# Patient Record
Sex: FEMALE | Race: WHITE | Employment: OTHER | URBAN - METROPOLITAN AREA
[De-identification: names, ages, dates, MRNs, and addresses within clinical notes are randomized per-mention and may not be internally consistent; named-entity substitution may affect disease eponyms.]

---

## 2021-04-13 ENCOUNTER — HOSPITAL ENCOUNTER (OUTPATIENT)
Dept: PREADMISSION TESTING | Age: 81
Discharge: HOME | End: 2021-04-13
Payer: MEDICARE

## 2021-04-13 ENCOUNTER — TRANSCRIBE ORDER (OUTPATIENT)
Dept: REGISTRATION | Age: 81
End: 2021-04-13

## 2021-04-13 DIAGNOSIS — Z01.818 PREOPERATIVE EXAMINATION, UNSPECIFIED: Primary | ICD-10-CM

## 2021-04-13 DIAGNOSIS — Z01.818 PREOPERATIVE EXAMINATION, UNSPECIFIED: ICD-10-CM

## 2021-04-13 LAB
ANION GAP SERPL CALC-SCNC: 5 MMOL/L (ref 3–18)
ATRIAL RATE: 79 BPM
BUN SERPL-MCNC: 13 MG/DL (ref 7–18)
BUN/CREAT SERPL: 22 (ref 12–20)
CALCIUM SERPL-MCNC: 9.1 MG/DL (ref 8.5–10.1)
CALCULATED P AXIS, ECG09: 89 DEGREES
CALCULATED R AXIS, ECG10: 75 DEGREES
CALCULATED T AXIS, ECG11: 87 DEGREES
CHLORIDE SERPL-SCNC: 106 MMOL/L (ref 100–111)
CO2 SERPL-SCNC: 28 MMOL/L (ref 21–32)
CREAT SERPL-MCNC: 0.6 MG/DL (ref 0.6–1.3)
DIAGNOSIS, 93000: NORMAL
ERYTHROCYTE [DISTWIDTH] IN BLOOD BY AUTOMATED COUNT: 13.1 % (ref 11.6–14.5)
GLUCOSE SERPL-MCNC: 97 MG/DL (ref 74–99)
HCT VFR BLD AUTO: 38.7 % (ref 35–45)
HGB BLD-MCNC: 12 G/DL (ref 12–16)
MCH RBC QN AUTO: 26.8 PG (ref 24–34)
MCHC RBC AUTO-ENTMCNC: 31 G/DL (ref 31–37)
MCV RBC AUTO: 86.4 FL (ref 74–97)
P-R INTERVAL, ECG05: 190 MS
PLATELET # BLD AUTO: 250 K/UL (ref 135–420)
PMV BLD AUTO: 10.2 FL (ref 9.2–11.8)
POTASSIUM SERPL-SCNC: 4.3 MMOL/L (ref 3.5–5.5)
Q-T INTERVAL, ECG07: 372 MS
QRS DURATION, ECG06: 94 MS
QTC CALCULATION (BEZET), ECG08: 426 MS
RBC # BLD AUTO: 4.48 M/UL (ref 4.2–5.3)
SODIUM SERPL-SCNC: 139 MMOL/L (ref 136–145)
VENTRICULAR RATE, ECG03: 79 BPM
WBC # BLD AUTO: 6.2 K/UL (ref 4.6–13.2)

## 2021-04-13 PROCEDURE — 85027 COMPLETE CBC AUTOMATED: CPT

## 2021-04-13 PROCEDURE — 36415 COLL VENOUS BLD VENIPUNCTURE: CPT

## 2021-04-13 PROCEDURE — 80048 BASIC METABOLIC PNL TOTAL CA: CPT

## 2021-04-13 PROCEDURE — 93005 ELECTROCARDIOGRAM TRACING: CPT

## 2021-04-23 ENCOUNTER — HOSPITAL ENCOUNTER (OUTPATIENT)
Dept: PREADMISSION TESTING | Age: 81
Discharge: HOME | End: 2021-04-23
Payer: MEDICARE

## 2021-04-23 PROCEDURE — U0003 INFECTIOUS AGENT DETECTION BY NUCLEIC ACID (DNA OR RNA); SEVERE ACUTE RESPIRATORY SYNDROME CORONAVIRUS 2 (SARS-COV-2) (CORONAVIRUS DISEASE [COVID-19]), AMPLIFIED PROBE TECHNIQUE, MAKING USE OF HIGH THROUGHPUT TECHNOLOGIES AS DESCRIBED BY CMS-2020-01-R: HCPCS

## 2021-04-24 LAB — SARS-COV-2, COV2NT: NOT DETECTED

## 2021-04-27 NOTE — H&P
Urology [address] [place], [place] [address] [address]  Tel: [phone]  Fax: [phone]    Patient : Alissa Fall   YOB: 1940   Birth Sex: Female      Current Gender: Female      Date:  04/09/2021 8:30 AM    Visit Type:   Office Visit   Assessment/Plan  # Detail Type Description    1. Assessment Frequency of micturition (R35.0). Patient Plan Pt underwent a PNE today, the pt tolerated the procedure well. The patient will be scheduled for 1st or 1st and 2nd Stage [employer]onics depending on the PNE results         2. Assessment Urgency of urination (R39.15). 3. Assessment Overactive bladder (N32.81). 4. Assessment History of recurrent urinary tract infection (Z87.440). Additional Visit Information      This 79year old female presents for Overactive Bladder and UTI. History of Present Illness  1. Overactive Bladder   Onset was gradual. It occurs daily. The problem is improving. Pertinent negatives include chills, constipation and fever. Additional information: pt w Symptoms consistent with overactive bladder including pelvic pressure burning before and after voiding frequency urgency urge incontinence she is currently on Detrol LA 4 mg which he thinks is helping she also thinks that helps better than Myrbetriq however she was on Myrbetriq for short period of time. At this time she will continue with current medications. Yarelis Prim 02/01/2021  Pt changed To start back on Myrbetriq 25 mg and stopped Detrol. She still having difficulties. I recommend that she restart on Detrol LA in addition to Myrbetriq 25 mg. If no improvement after 2 weeks she should call and let me know and we can increase her to 50 mg.  3/15/2021  Pt has minimal improvement on combination of Myrbetriq 50mg and Detrol LA 4mg. She is moving to [place] in June.   We reviewed options including Axonics SNS and Botox  4/9/2021  Pt here for f/u she is here to undergo PNE    2.  UTI   Pertinent history includes being over 49. Pertinent history does not include diabetes, alcohol consumption or prostatitis. Pertinent negatives include constipation. Additional information: Patient is here today because she has had 2  urinary tract infections. However both infections grew skin contaminants. It is difficult to determine if this is actually real infection. So today will be obtain a catheterized specimen for culture this will certainly tell us if she is actually having a urinary tract infection or if her specimen was more of a poor specimen given by the patient or for is actually infection. 2/1/2021  Pt appears to have UTI, I will send Keflex 250mg #21. Past Medical/Surgical History   (Reviewed, updated)  Disease/disorder Onset Date Management Date Comments     Cholecystectomy         Problem List  Problem List reviewed. Medications (active prior to today)  Medication Instructions Start Date Stop Date Refilled Elsewhere   Fosamax 70 mg tablet take 1 tablet by oral route  every week in the morning, at least 30 min before first food, beverage, or medication of day //   Y   Vitamin D2 1,250 mcg (50,000 unit) capsule take 1 capsule by ORAL route  every month //   Y   Prilosec 2.5 mg oral suspension,delayed release  //   Y   Zocor 10 mg tablet take 1 tablet by oral route  every day in the evening //   Y   Lyrica 75 mg capsule take 1 capsule by ORAL route 3 times every day 11/13/2020   N   Centrum Silver 0.4 mg-300 mcg-250 mcg tablet  //   Y   Flonase Allergy Relief 50 mcg/actuation nasal spray,suspension inhale 2 spray by intranasal route  every day in each nostril //   Kerline Less  //   Y   Detrol LA 4 mg capsule,extended release take 1 capsule by oral route  every day 02/01/2021 02/01/2021 N   Myrbetriq 50 mg tablet,extended release take 1 tablet by oral route  every day swallowing whole with water. Do not crush, chew and/or divide.  02/26/2021 2021 N       Medication Reconciliation  Medications reconciled today. Medication Reviewed  Adherence Medication Name Sig Desc Elsewhere Status   taking as directed Centrum Silver 0.4 mg-300 mcg-250 mcg tablet  Y Verified   taking as directed Fosamax 70 mg tablet take 1 tablet by oral route  every week in the morning, at least 30 min before first food, beverage, or medication of day Y Verified   taking as directed Zocor 10 mg tablet take 1 tablet by oral route  every day in the evening Y Verified   taking as directed Detrol LA 4 mg capsule,extended release take 1 capsule by oral route  every day N Verified   taking as directed Lyrica 75 mg capsule take 1 capsule by ORAL route 3 times every day N Verified   taking as directed Flonase Allergy Relief 50 mcg/actuation nasal spray,suspension inhale 2 spray by intranasal route  every day in each nostril Y Verified   taking as directed Myrbetriq 50 mg tablet,extended release take 1 tablet by oral route  every day swallowing whole with water. Do not crush, chew and/or divide. N Verified   taking as directed Vitamin D2 1,250 mcg (50,000 unit) capsule take 1 capsule by ORAL route  every month Y Verified   taking as directed Prilosec 2.5 mg oral suspension,delayed release  Y Verified   taking as directed [id number]  [address] Reaction (Severity) Medication Name Comment   PENICILLIN Rash       Reviewed, no changes. Family History   (Reviewed, updated)    Relationship Family Member Name  Age at Death Condition Onset Age Cause of Death   Family h/o    Diabetes     Family h/o    Hyperlipidemia       Social History  (Reviewed, updated)  Tobacco use reviewed. Preferred language is [place]. Marital Status/Family/Social Support  Marital status:      Tobacco use status: Current non-smoker. Smoking status: Never smoker.     Tobacco Screening  Patient has never used tobacco. Patient has not used tobacco in the last 30 days. Patient has not used smokeless tobacco in the last 30 days. Smoking Status  Type Smoking Status Usage Per Day Years Used Pack Years Total Pack Years    Never smoker         Alcohol  There is no history of alcohol use. Caffeine  The patient uses caffeine: coffee. Review of Systems  System Neg/Pos Details   Constitutional Negative Chills and Fever. ENMT Negative Ear infections and Sore throat. Eyes Negative Blurred vision, Double vision and Eye pain. Respiratory Negative Asthma, Chronic cough, Dyspnea and Wheezing. Cardio Negative Chest pain. GI Negative Constipation, Decreased appetite, Diarrhea, Nausea and Vomiting. Endocrine Negative Cold intolerance, Heat intolerance, Increased thirst and Weight loss. Neuro Negative Headache and Tremors. Psych Negative Anxiety and Depression. Integumentary Negative Itching skin and Rash. MS Negative Back pain and Joint pain. Hema/Lymph Negative Easy bleeding.        Vital Signs   Height  Time ft in cm Last Measured Height Position   10:27 AM 5.0 3.00 160.02 11/13/2020 0     Weight/BSA/BMI  Time lb oz kg Context BMI kg/m2 BSA m2   10:27 .00  70.307  27.46      Measured by  Time Measured by   10:27 AM Emmy Onifade     Medications (added, continued, or stopped today)  Start Date Medication Directions PRN Status PRN Reason Instruction Stop Date    AREDS2  MISCELL MISCELL  N       Centrum Silver 0.4 mg-300 mcg-250 mcg tablet  N      02/01/2021 Detrol LA 4 mg capsule,extended release take 1 capsule by oral route  every day N       Flonase Allergy Relief 50 mcg/actuation nasal spray,suspension inhale 2 spray by intranasal route  every day in each nostril N       Fosamax 70 mg tablet take 1 tablet by oral route  every week in the morning, at least 30 min before first food, beverage, or medication of day N      11/13/2020 Lyrica 75 mg capsule take 1 capsule by ORAL route 3 times every day N      02/26/2021 Myrbetriq 50 mg tablet,extended release take 1 tablet by oral route  every day swallowing whole with water. Do not crush, chew and/or divide. N       Prilosec 2.5 mg oral suspension,delayed release  N       Vitamin D2 1,250 mcg (50,000 unit) capsule take 1 capsule by ORAL route  every month N       Zocor 10 mg tablet take 1 tablet by oral route  every day in the evening N          Active Patient Care Team Members  Name Contact Agency Type Support Role Relationship Active Date Inactive Date [facility] ([facility])   Emergency Contact Child, Mother is the Patient      Jolene Lyon   Patient provider PCP          Provider:    Michel Darby MD 04/09/2021 10:32 AM     Document generated by:  Ting Shanks 04/09/2021 10:31 AM      ----------------------------------------------------------------------------------------------------------------------------------------------------------------------      Electronically signed by Michel Darby MD on 04/10/2021 11:15 AM

## 2021-04-29 ENCOUNTER — ANESTHESIA (OUTPATIENT)
Dept: SURGERY | Age: 81
End: 2021-04-29
Payer: MEDICARE

## 2021-04-29 ENCOUNTER — APPOINTMENT (OUTPATIENT)
Dept: GENERAL RADIOLOGY | Age: 81
End: 2021-04-29
Attending: UROLOGY
Payer: MEDICARE

## 2021-04-29 ENCOUNTER — ANESTHESIA EVENT (OUTPATIENT)
Dept: SURGERY | Age: 81
End: 2021-04-29
Payer: MEDICARE

## 2021-04-29 ENCOUNTER — HOSPITAL ENCOUNTER (OUTPATIENT)
Age: 81
Setting detail: OUTPATIENT SURGERY
Discharge: HOME | End: 2021-04-29
Attending: UROLOGY | Admitting: UROLOGY
Payer: MEDICARE

## 2021-04-29 VITALS
BODY MASS INDEX: 27.02 KG/M2 | DIASTOLIC BLOOD PRESSURE: 60 MMHG | HEART RATE: 85 BPM | OXYGEN SATURATION: 96 % | RESPIRATION RATE: 16 BRPM | WEIGHT: 152.5 LBS | SYSTOLIC BLOOD PRESSURE: 119 MMHG | TEMPERATURE: 98.2 F | HEIGHT: 63 IN

## 2021-04-29 PROCEDURE — 76210000023 HC REC RM PH II 2 TO 2.5 HR: Performed by: UROLOGY

## 2021-04-29 PROCEDURE — 77030040361 HC SLV COMPR DVT MDII -B: Performed by: UROLOGY

## 2021-04-29 PROCEDURE — 77030031139 HC SUT VCRL2 J&J -A: Performed by: UROLOGY

## 2021-04-29 PROCEDURE — 74011000250 HC RX REV CODE- 250: Performed by: NURSE ANESTHETIST, CERTIFIED REGISTERED

## 2021-04-29 PROCEDURE — 77030020782 HC GWN BAIR PAWS FLX 3M -B: Performed by: UROLOGY

## 2021-04-29 PROCEDURE — C1897 LEAD, NEUROSTIM TEST KIT: HCPCS | Performed by: UROLOGY

## 2021-04-29 PROCEDURE — 77030034475 HC MISC IMPL SPN: Performed by: UROLOGY

## 2021-04-29 PROCEDURE — 74011250636 HC RX REV CODE- 250/636: Performed by: UROLOGY

## 2021-04-29 PROCEDURE — 2709999900 HC NON-CHARGEABLE SUPPLY: Performed by: UROLOGY

## 2021-04-29 PROCEDURE — 76010000153 HC OR TIME 1.5 TO 2 HR: Performed by: UROLOGY

## 2021-04-29 PROCEDURE — 74011250636 HC RX REV CODE- 250/636: Performed by: NURSE ANESTHETIST, CERTIFIED REGISTERED

## 2021-04-29 PROCEDURE — 74011000258 HC RX REV CODE- 258: Performed by: NURSE ANESTHETIST, CERTIFIED REGISTERED

## 2021-04-29 PROCEDURE — 77030002888 HC SUT CHRMC J&J -A: Performed by: UROLOGY

## 2021-04-29 PROCEDURE — 76060000034 HC ANESTHESIA 1.5 TO 2 HR: Performed by: UROLOGY

## 2021-04-29 PROCEDURE — 77030018842 HC SOL IRR SOD CL 9% BAXT -A: Performed by: UROLOGY

## 2021-04-29 PROCEDURE — 74011000250 HC RX REV CODE- 250: Performed by: UROLOGY

## 2021-04-29 DEVICE — TINED LEAD KIT
Type: IMPLANTABLE DEVICE | Site: BACK | Status: FUNCTIONAL
Brand: AXONICS

## 2021-04-29 RX ORDER — MAGNESIUM SULFATE 100 %
4 CRYSTALS MISCELLANEOUS AS NEEDED
Status: CANCELLED | OUTPATIENT
Start: 2021-04-29

## 2021-04-29 RX ORDER — PROPOFOL 10 MG/ML
INJECTION, EMULSION INTRAVENOUS
Status: DISCONTINUED | OUTPATIENT
Start: 2021-04-29 | End: 2021-04-29 | Stop reason: HOSPADM

## 2021-04-29 RX ORDER — FENTANYL CITRATE 50 UG/ML
INJECTION, SOLUTION INTRAMUSCULAR; INTRAVENOUS AS NEEDED
Status: DISCONTINUED | OUTPATIENT
Start: 2021-04-29 | End: 2021-04-29 | Stop reason: HOSPADM

## 2021-04-29 RX ORDER — FENTANYL CITRATE 50 UG/ML
25 INJECTION, SOLUTION INTRAMUSCULAR; INTRAVENOUS
Status: CANCELLED | OUTPATIENT
Start: 2021-04-29

## 2021-04-29 RX ORDER — MIDAZOLAM HYDROCHLORIDE 1 MG/ML
INJECTION, SOLUTION INTRAMUSCULAR; INTRAVENOUS AS NEEDED
Status: DISCONTINUED | OUTPATIENT
Start: 2021-04-29 | End: 2021-04-29 | Stop reason: HOSPADM

## 2021-04-29 RX ORDER — SODIUM CHLORIDE 0.9 % (FLUSH) 0.9 %
5-40 SYRINGE (ML) INJECTION EVERY 8 HOURS
Status: CANCELLED | OUTPATIENT
Start: 2021-04-29

## 2021-04-29 RX ORDER — DEXTROSE 50 % IN WATER (D50W) INTRAVENOUS SYRINGE
25-50 AS NEEDED
Status: CANCELLED | OUTPATIENT
Start: 2021-04-29

## 2021-04-29 RX ORDER — SODIUM CHLORIDE, SODIUM LACTATE, POTASSIUM CHLORIDE, CALCIUM CHLORIDE 600; 310; 30; 20 MG/100ML; MG/100ML; MG/100ML; MG/100ML
125 INJECTION, SOLUTION INTRAVENOUS CONTINUOUS
Status: DISCONTINUED | OUTPATIENT
Start: 2021-04-29 | End: 2021-04-29 | Stop reason: HOSPADM

## 2021-04-29 RX ORDER — NALOXONE HYDROCHLORIDE 0.4 MG/ML
0.2 INJECTION, SOLUTION INTRAMUSCULAR; INTRAVENOUS; SUBCUTANEOUS AS NEEDED
Status: CANCELLED | OUTPATIENT
Start: 2021-04-29

## 2021-04-29 RX ORDER — GLYCOPYRROLATE 0.2 MG/ML
INJECTION INTRAMUSCULAR; INTRAVENOUS AS NEEDED
Status: DISCONTINUED | OUTPATIENT
Start: 2021-04-29 | End: 2021-04-29 | Stop reason: HOSPADM

## 2021-04-29 RX ORDER — SODIUM CHLORIDE, SODIUM LACTATE, POTASSIUM CHLORIDE, CALCIUM CHLORIDE 600; 310; 30; 20 MG/100ML; MG/100ML; MG/100ML; MG/100ML
100 INJECTION, SOLUTION INTRAVENOUS CONTINUOUS
Status: CANCELLED | OUTPATIENT
Start: 2021-04-29

## 2021-04-29 RX ORDER — LIDOCAINE HYDROCHLORIDE 20 MG/ML
INJECTION, SOLUTION EPIDURAL; INFILTRATION; INTRACAUDAL; PERINEURAL AS NEEDED
Status: DISCONTINUED | OUTPATIENT
Start: 2021-04-29 | End: 2021-04-29 | Stop reason: HOSPADM

## 2021-04-29 RX ORDER — INSULIN LISPRO 100 [IU]/ML
INJECTION, SOLUTION INTRAVENOUS; SUBCUTANEOUS ONCE
Status: CANCELLED | OUTPATIENT
Start: 2021-04-29 | End: 2021-04-29

## 2021-04-29 RX ORDER — EPHEDRINE SULFATE/0.9% NACL/PF 50 MG/5 ML
SYRINGE (ML) INTRAVENOUS AS NEEDED
Status: DISCONTINUED | OUTPATIENT
Start: 2021-04-29 | End: 2021-04-29 | Stop reason: HOSPADM

## 2021-04-29 RX ORDER — CEFAZOLIN SODIUM/WATER 2 G/20 ML
2 SYRINGE (ML) INTRAVENOUS ONCE
Status: COMPLETED | OUTPATIENT
Start: 2021-04-29 | End: 2021-04-29

## 2021-04-29 RX ORDER — ONDANSETRON 2 MG/ML
INJECTION INTRAMUSCULAR; INTRAVENOUS AS NEEDED
Status: DISCONTINUED | OUTPATIENT
Start: 2021-04-29 | End: 2021-04-29 | Stop reason: HOSPADM

## 2021-04-29 RX ORDER — FENTANYL CITRATE 50 UG/ML
25 INJECTION, SOLUTION INTRAMUSCULAR; INTRAVENOUS AS NEEDED
Status: CANCELLED | OUTPATIENT
Start: 2021-04-29

## 2021-04-29 RX ORDER — KETAMINE HYDROCHLORIDE 10 MG/ML
INJECTION, SOLUTION INTRAMUSCULAR; INTRAVENOUS AS NEEDED
Status: DISCONTINUED | OUTPATIENT
Start: 2021-04-29 | End: 2021-04-29 | Stop reason: HOSPADM

## 2021-04-29 RX ORDER — SODIUM CHLORIDE 0.9 % (FLUSH) 0.9 %
5-40 SYRINGE (ML) INJECTION AS NEEDED
Status: CANCELLED | OUTPATIENT
Start: 2021-04-29

## 2021-04-29 RX ADMIN — Medication 10 MG: at 08:15

## 2021-04-29 RX ADMIN — PROPOFOL 40 MCG/KG/MIN: 10 INJECTION, EMULSION INTRAVENOUS at 07:35

## 2021-04-29 RX ADMIN — LIDOCAINE HYDROCHLORIDE 100 MG: 20 INJECTION, SOLUTION EPIDURAL; INFILTRATION; INTRACAUDAL; PERINEURAL at 07:35

## 2021-04-29 RX ADMIN — PHENYLEPHRINE HYDROCHLORIDE 100 MCG: 10 INJECTION INTRAVENOUS at 07:58

## 2021-04-29 RX ADMIN — SODIUM CHLORIDE, SODIUM LACTATE, POTASSIUM CHLORIDE, AND CALCIUM CHLORIDE 125 ML/HR: 600; 310; 30; 20 INJECTION, SOLUTION INTRAVENOUS at 06:42

## 2021-04-29 RX ADMIN — SODIUM CHLORIDE, SODIUM LACTATE, POTASSIUM CHLORIDE, AND CALCIUM CHLORIDE 125 ML/HR: 600; 310; 30; 20 INJECTION, SOLUTION INTRAVENOUS at 10:07

## 2021-04-29 RX ADMIN — SODIUM CHLORIDE, SODIUM LACTATE, POTASSIUM CHLORIDE, AND CALCIUM CHLORIDE: 600; 310; 30; 20 INJECTION, SOLUTION INTRAVENOUS at 08:28

## 2021-04-29 RX ADMIN — PHENYLEPHRINE HYDROCHLORIDE 100 MCG: 10 INJECTION INTRAVENOUS at 07:59

## 2021-04-29 RX ADMIN — FENTANYL CITRATE 25 MCG: 50 INJECTION, SOLUTION INTRAMUSCULAR; INTRAVENOUS at 07:25

## 2021-04-29 RX ADMIN — PHENYLEPHRINE HYDROCHLORIDE 100 MCG: 10 INJECTION INTRAVENOUS at 08:18

## 2021-04-29 RX ADMIN — MIDAZOLAM 2 MG: 1 INJECTION INTRAMUSCULAR; INTRAVENOUS at 07:24

## 2021-04-29 RX ADMIN — FENTANYL CITRATE 25 MCG: 50 INJECTION, SOLUTION INTRAMUSCULAR; INTRAVENOUS at 07:38

## 2021-04-29 RX ADMIN — GLYCOPYRROLATE 0.2 MG: 0.2 INJECTION INTRAMUSCULAR; INTRAVENOUS at 07:24

## 2021-04-29 RX ADMIN — FENTANYL CITRATE 25 MCG: 50 INJECTION, SOLUTION INTRAMUSCULAR; INTRAVENOUS at 07:48

## 2021-04-29 RX ADMIN — PHENYLEPHRINE HYDROCHLORIDE 200 MCG: 10 INJECTION INTRAVENOUS at 08:10

## 2021-04-29 RX ADMIN — FENTANYL CITRATE 25 MCG: 50 INJECTION, SOLUTION INTRAMUSCULAR; INTRAVENOUS at 07:32

## 2021-04-29 RX ADMIN — KETAMINE HYDROCHLORIDE 10 MG: 10 INJECTION, SOLUTION INTRAMUSCULAR; INTRAVENOUS at 07:32

## 2021-04-29 RX ADMIN — LIDOCAINE HYDROCHLORIDE 60 MG: 20 INJECTION, SOLUTION EPIDURAL; INFILTRATION; INTRACAUDAL; PERINEURAL at 07:42

## 2021-04-29 RX ADMIN — ONDANSETRON HYDROCHLORIDE 4 MG: 2 INJECTION INTRAMUSCULAR; INTRAVENOUS at 07:57

## 2021-04-29 RX ADMIN — CEFAZOLIN 2 G: 1 INJECTION, POWDER, FOR SOLUTION INTRAVENOUS at 07:38

## 2021-04-29 NOTE — PROGRESS NOTES
Father Mert Bustamante visited with   [facility], who is a 79 y.o.,female. Father Mert Bustamante provided [employer]. Father Mert Bustamante provided [employer] Chaplains will continue to follow and will provide pastoral care on an as needed/requested basis.  recommends bedside caregivers page  on duty if patient shows signs of acute spiritual or emotional distress.      Fr Annette Chinchilla, [address] [facility] - Office

## 2021-04-29 NOTE — PERIOP NOTES
Per OR circulator, patient request to speak with .  Tan Georgeolla that  was working on getting a  in the building for her.   She reported being ok waiting  Until after procedure to talk to with a .

## 2021-04-29 NOTE — PERIOP NOTES
Dr Mikayla Saenz is on the list of doctors who order ANCEF when patients are allergic to PENICILLIN when the reacion to Penicillin is NOT Respiratory or Anaphylactic.   Will double check when Dr Mikayla Saenz is in the hospital.

## 2021-04-29 NOTE — PERIOP NOTES
Discharge instructions given to the patient's son. AVS med list reviewed for duplicates. Opportunity for questions provided.

## 2021-04-29 NOTE — DISCHARGE INSTRUCTIONS
Gilberto Aguilar. Leticia Nunez M.D. [facility]  [address], [address] [place] [address], Sonny Workman [facility]  Office: [phone]  Fax:    [phone]: Procedure(s):  [address] W[address], [address] Urology IMMEDIATELY if any of the following occur:     You are unable to urinate. Urgency to urinate is not uncommon.  You find yourself urinating small frequent amounts associated with severe lower abdominal discomfort.  Bright red blood with clots in the urine. Some reddish urine is not uncommon and should be treated with increasing the amount of fluids you drink.  Temperature above 101.5° and / or chills.  You are nauseous and / or vomiting and you cannot hold down any fluids.  Your pain is not controlled with the pain medication prescribed. Special Considerations:      Do not drive for at least 24 hours after the procedure and until you are no longer taking narcotic pain medication and you are able to move and react without hesitation. MEDICATIONS:  Pain   []  Norco®   []  Percocet® []  Dilaudid®    [x]  Tramadol   Antibiotics   []  Cipro   [x]  Keflex    [] Levaquin   []  Bactrim DS®       Urination   []  Vesicare®   []  Flomax     Burning   []  Pyridium®   []  UribelTM     Nausea   []  Zofran®   []  Phenergan®     Miscellaneous   []           [] Prescriptions Written on Chart    [] Prescriptions sent Electronically           Our office will call you tomorrow to schedule your first follow-up appointment. Please contact [facility]. Urology at [phone] or go to the nearest Emergency Department / Urgent Care facility for any other medical questions or concerns.     DISCHARGE SUMMARY from Nurse    PATIENT INSTRUCTIONS:    After general anesthesia or intravenous sedation, for 24 hours or while taking prescription Narcotics:  · Limit your activities  · Do not drive and operate hazardous machinery  · Do not make important personal or business decisions  · Do  not drink alcoholic beverages  · If you have not urinated within 8 hours after discharge, please contact your surgeon on call. Report the following to your surgeon:  · Excessive pain, swelling, redness or odor of or around the surgical area  · Temperature over 100.5  · Nausea and vomiting lasting longer than 4 hours or if unable to take medications  · Any signs of decreased circulation or nerve impairment to extremity: change in color, persistent  numbness, tingling, coldness or increase pain  · Any questions    What to do at Home:  Recommended activity: Activity as tolerated and no driving for today,     If you experience any of the following symptoms as per, please follow up with Dr Ysabel Santana at [phone]. *  Please give a list of your current medications to your Primary Care Provider. *  Please update this list whenever your medications are discontinued, doses are      changed, or new medications (including over-the-counter products) are added. *  Please carry medication information at all times in case of emergency situations. These are general instructions for a healthy lifestyle:    No smoking/ No tobacco products/ Avoid exposure to second hand smoke  Surgeon General's Warning:  Quitting smoking now greatly reduces serious risk to your health. Obesity, smoking, and sedentary lifestyle greatly increases your risk for illness    A healthy diet, regular physical exercise & weight monitoring are important for maintaining a healthy lifestyle    You may be retaining fluid if you have a history of heart failure or if you experience any of the following symptoms:  Weight gain of 3 pounds or more overnight or 5 pounds in a week, increased swelling in our hands or feet or shortness of breath while lying flat in bed. Please call your doctor as soon as you notice any of these symptoms; do not wait until your next office visit. 10 Things to Do When You Have COVID-19    Stay home.   Don't go to school, work, or public areas. And don't use public transportation, ride-shares, or taxis unless you have no choice. Leave your home only if you need to get medical care. But call the doctor's office first so they know you're coming. And wear a cloth face cover. Ask before leaving isolation. Talk with your doctor or other health professional about when it will be safe for you to leave isolation. Wear a cloth face cover when you are around other people. It can help stop the spread of the virus when you cough or sneeze. Limit contact with people in your home. If possible, stay in a separate bedroom and use a separate bathroom. Avoid contact with pets and other animals. If possible, have a friend or family member care for them while you're sick. Cover your mouth and nose with a tissue when you cough or sneeze. Then throw the tissue in the trash right away. Wash your hands often, especially after you cough or sneeze. Use soap and water, and scrub for at least 20 seconds. If soap and water aren't available, use an alcohol-based hand . Don't share personal household items. These include bedding, towels, cups and glasses, and eating utensils. Clean and disinfect your home every day. Use household  or disinfectant wipes or sprays. Take special care to clean things that you grab with your hands. These include doorknobs, remote controls, phones, and handles on your refrigerator and microwave. And don't forget countertops, tabletops, bathrooms, and computer keyboards. Take acetaminophen (Tylenol) to relieve fever and body aches. Read and follow all instructions on the label. Current as of: December 18, 2020               Content Version: 12.8  © 4927-3346 [employer]. Care instructions adapted under license by [employer] (which disclaims liability or warranty for this information).  If you have questions about a medical condition or this instruction, always ask your healthcare professional. [address] any warranty or liability for your use of this information. Patient armband removed and shredded    The discharge information has been reviewed with the patient and caregiver. The patient and caregiver verbalized understanding. Discharge medications reviewed with the patient and caregiver and appropriate educational materials and side effects teaching were provided.   ___________________________________________________________________________________________________________________________________

## 2021-04-29 NOTE — PERIOP NOTES
[employer] representative at bedside with the patient and the son doing teaching and demonstration of the proper use of the stimulator.

## 2021-04-29 NOTE — OP NOTES
Postoperative Note    Patient: Sujey Cueto  YOB: 1940  MRN: [id number]    Date of Procedure: 4/29/2021     Pre-Op Diagnosis: FREQUENCY OF MICTURITION    Post-Op Diagnosis: Same as preoperative diagnosis. Procedure(s):  AXONICS I & II IMPLANT W/C-ARM, AXONICS    Surgeon(s):  Ashwin Vora MD    Surgical Assistant: Surg Asst-1: Anshul Deluca    Anesthesia: Other     Estimated Blood Loss (mL): Minimal    Complications: None    Specimens: * No specimens in log *     Implants:   Implant Name Type Inv. Item Serial No.  Lot No. LRB No. Used Action   AXONICS SNM SYTEM TINED LEAD   [id number] [employer]  N/A 1 Implanted       Drains: * No LDAs found *    Findings: Urinary frequency and urgency    After informed consent was obtained, the patient was taken to the operating room, [facility] the [facility]. The pt was prepped and draped in usual surgical fashion. Tape was placed on each buttock and pulled laterally to help in visualization of the anal sphincter. A  was used to confirm the patient was level. The patient was then prepped and draped in usual surgical fashion. The C-arm was moved into the lateral position to image the area of the sacral promontory to the coccyx. We then obtained an AP view to identify the superior medial aspect of S3. This was then marked on the skin level on the RIGHT side. Next, the skin was anesthetized with 0.25% Marcaine:1% Lidocaine without epinephrine. The anesthetic was placed at the skin insertion point and at the periosteal level for the lead insertion, as well as preparing the pocket for the neurostimulator. A foramen needle was then inserted at the marked position parallel to the foraminal line. It entered the S3 foramen without difficulty. Depth of the needle was confirmed with lateral fluoroscopy.  Proper needle position was confirmed by direct observation of lifting of the perineum or \"bellowing\" and the observance of plantar flexion of the great toe using the test stimulator box. The needle stylet was removed, and a directional guide was placed, confirming its placement and depth on fluoroscopy. The foramen needle was then removed. An incision was made laterally from the directional guide through the skin, and then a dilator and introducer sheath was placed over the directional guide and directed into the foramen, until the opaque marker of the dilator was seen at the midline the of the sacrum. The dilator obturator was unlocked and removed along with the directional guide. The tined lead with a curved stylet, was then placed through the introducer sheath so that lead 2 and 3 straddled the anterior margin of the bone. Position was confirmed by fluoroscopy. The lead was then further introduced, until three electrodes were visible below the sacrum, and one electrode within the sacrum. Each electrode was tested for visualization of jj and plantar flexion of the great toe. We had good responses on all four leads both plantar flexion and jj response. After satisfactory positioning was confirmed both AP and lateral views, the introducer sheath was retracted under continuous fluoroscopy, deploying the tined leads into the presacral tissue. Leads were then retested to confirm appropriate motor response. A tunneling tool with an overlying plastic sheath was inserted from the lead insertion site  subcutaneously to the new INS pocketsite which had been previously marked to be located  in the hollow of the ileum. After administration of local anesthetic, a 2.5cm incision was made  lateral to the site of INS placement site, to a depth no greater than 2cm deep to the skin. A  pocket was created to accommodate the INS using combination of sharp and blunt  dissection, maintaining the depth of 2 cm. The tunneling tool was passed from the lead wire  insertion site to the lateral pocket.  The sharp tip of the tunneling tool was removed and the  lead was fed through the sheath, exiting at the pocket site. The wound was copiously irrigated with antibiotic saline solution. The [employer] neurostimulator was then connected to the tined lead after it was dried off. The hex nut wrench was used to tighten down the ratcheted hex nut. The neurostimulator was placed back into the pocket with the wire hidden underneath it. The neurostimulator was interrogated, appropriate function was noted. The wound was then closed in two layers; a 3-0 Vicryl running suture for the Mt layer, and a 4-0 Vicryl for the subcuticular layer. The wound was then washed off and dried. Dermabond was placed over the incisions, and the procedure ended. The patient was placed in the supine position on the stretcher, awakened from IV sedation, and transferred to the recovery room awake, alert, and in stable condition.

## 2021-04-29 NOTE — PERIOP NOTES
Reviewed PTA medication list with patient/caregiver and patient/caregiver denies any additional medications. Patient admits to having a responsible adult care for them at home for at least 24 hours after surgery. Patient encouraged to use gown warming system and informed that using said warming gown to regulate body temperature prior to a procedure has been shown to help reduce the risks of blood clots and infection. Patient's pharmacy of choice verified and documented in PTA medication section. Dual skin assessment & fall risk band verification completed with Glendy Willis RN.

## 2021-04-29 NOTE — INTERVAL H&P NOTE
Update History & Physical    The Patient's History and Physical of April 2, 2021 was reviewed with the patient and I examined the patient. There was no change. The surgical site was confirmed by the patient and me. Plan:  The risk, benefits, expected outcome, and alternative to the recommended procedure have been discussed with the patient. Patient understands and wants to proceed with the procedure.     Electronically signed by Hernando Avina MD on 4/29/2021 at 6:36 AM

## 2021-04-29 NOTE — ANESTHESIA PREPROCEDURE EVALUATION
Relevant Problems   No relevant active problems       Anesthetic History   No history of anesthetic complications            Review of Systems / Medical History  Patient summary reviewed, nursing notes reviewed and pertinent labs reviewed    Pulmonary  Within defined limits                 Neuro/Psych   Within defined limits           Cardiovascular                       GI/Hepatic/Renal     GERD: well controlled           Endo/Other        Arthritis    Comments: Polyarthralgia. Other Findings              Physical Exam    Airway  Mallampati: II  TM Distance: 4 - 6 cm  Neck ROM: normal range of motion   Mouth opening: Normal     Cardiovascular    Rhythm: regular  Rate: normal         Dental    Dentition: Edentulous     Pulmonary  Breath sounds clear to auscultation               Abdominal  GI exam deferred       Other Findings            Anesthetic Plan    ASA: 2  Anesthesia type: MAC and general - backup          Induction: Intravenous  Anesthetic plan and risks discussed with: Patient      GA backup.

## 2021-04-30 NOTE — ANESTHESIA POSTPROCEDURE EVALUATION
Post-Anesthesia Evaluation & Assessment    Visit Vitals  /60   Pulse 85   Temp 36.8 °C (98.2 °F)   Resp 16   Ht 5' 3\" (1.6 m)   Wt 69.2 kg (152 lb 8 oz)   SpO2 96%   BMI 27.01 kg/m²       Nausea/Vomiting: no nausea    Post-operative hydration adequate.     Pain score (VAS): 0    Mental status & Level of consciousness: alert and oriented x 3    Neurological status: moves all extremities, sensation grossly intact    Pulmonary status: airway patent, no supplemental oxygen required    Complications related to anesthesia: none    Additional comments:

## 2021-10-27 ENCOUNTER — HOSPITAL ENCOUNTER (OUTPATIENT)
Dept: PREADMISSION TESTING | Age: 81
Discharge: HOME | End: 2021-10-27

## 2021-10-27 VITALS — HEIGHT: 63 IN | BODY MASS INDEX: 23.74 KG/M2 | WEIGHT: 134 LBS

## 2021-10-27 RX ORDER — ERGOCALCIFEROL 1.25 MG/1
50000 CAPSULE ORAL
COMMUNITY

## 2021-10-27 RX ORDER — LEVOFLOXACIN 5 MG/ML
500 INJECTION, SOLUTION INTRAVENOUS ONCE
Status: CANCELLED | OUTPATIENT
Start: 2021-10-27 | End: 2021-10-27

## 2021-10-27 RX ORDER — DOCUSATE SODIUM 100 MG/1
100 CAPSULE, LIQUID FILLED ORAL
COMMUNITY

## 2021-10-27 RX ORDER — IBUPROFEN 600 MG/1
600 TABLET ORAL
COMMUNITY

## 2021-10-27 RX ORDER — LOPERAMIDE HYDROCHLORIDE 2 MG/1
4 CAPSULE ORAL AS NEEDED
COMMUNITY

## 2021-10-27 RX ORDER — DIPHENHYDRAMINE CITRATE AND IBUPROFEN 38; 200 MG/1; MG/1
1 TABLET, COATED ORAL
Status: ON HOLD | COMMUNITY
End: 2021-11-04

## 2021-10-27 RX ORDER — METHOCARBAMOL 500 MG/1
500 TABLET, FILM COATED ORAL
COMMUNITY

## 2021-10-27 RX ORDER — FLUTICASONE PROPIONATE 50 MCG
1 SPRAY, SUSPENSION (ML) NASAL
COMMUNITY

## 2021-10-27 RX ORDER — SAME BUTANEDISULFONATE/BETAINE 400-600 MG
250 POWDER IN PACKET (EA) ORAL DAILY
COMMUNITY

## 2021-10-27 RX ORDER — FERROUS SULFATE 324(65)MG
325 TABLET, DELAYED RELEASE (ENTERIC COATED) ORAL
COMMUNITY

## 2021-10-27 RX ORDER — SODIUM CHLORIDE, SODIUM LACTATE, POTASSIUM CHLORIDE, CALCIUM CHLORIDE 600; 310; 30; 20 MG/100ML; MG/100ML; MG/100ML; MG/100ML
125 INJECTION, SOLUTION INTRAVENOUS CONTINUOUS
Status: CANCELLED | OUTPATIENT
Start: 2021-10-27

## 2021-10-27 NOTE — PERIOP NOTES
Operative consent filled out according to MD order on surgical posting, verbatim. No DNR. During PAT interview, patient's Caregiver, Bulmaro Nino advised to have paitent self quarantine after Covid test prior to [employer]. Patient to have PCR Covid19 testing on 10/29/2021 prior to surgery at [place] [facility]. Instructed caregiver, on patient to not bring any valuables on DOS including Pocketbook, wallet, jewelry, cell phone, lap top computer or tablet. Patient's caregiver instructed for patient not to wear make up, powders, deodorant, creams, or lotions on DOS. CHG wipes to mailed to facility to which patient resides and instructions given to Bulmaro Nino, caregiver.

## 2021-11-03 NOTE — H&P
Urology [address] [place] [address] [address] [address]  Tel: [phone]  Fax: [phone]    Patient : Haja Sabillon   YOB: 1940   Birth Sex: Female      Current Gender: Female      Date:  11/02/2021 7:38 AM    Visit Type:   Office Visit   Assessment/Plan  # Detail Type Description    1. 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 Patient Plan Patient with gross hematuria after she moved to [place]. She was evaluated at that time by urologist CT scan and cystoscopy confirmed a 1.9 cm mass in bladder. She has not yet been treated. After she moved to [place] her family was then transferred back to [place]. On the trip cross-country she fell while in [place] and fractured her hip. She was evaluated in the emergency room in [place] and was told that she did not need any surgery and was recommended to have Rehabilitation and that was all she needed for her hip Heal.  At the time she had Lovenox causing gross hematuria. That was subsequently stopped. She has had no hematuria since. At this time she needs to undergo cystoscopy and transurethral resection of bladder tumor risks including pain infection bleeding bladder injury recurrence of disease were reviewed she understands and will proceed I also explained to her the need for gemcitabine postoperatively to decrease chances of recurrence. My concern is that she has a left hip fracture that was not repaired and she will need to be placed into stirrups to undergo surgery. I explained this the patient and her daughter I will have orthopedics evaluate patient for clearance to be placed in stirrups during the procedure. I reviewed all of the notes that were provided from her other physicians including urologist.  Her daughter was also here to help with this discussion         2. Assessment Gross hematuria (R31.0).          3. Assessment Closed fracture of left femur, unspecified fracture morphology, unspecified portion of femur, sequela (S72.92XS). 4. Assessment Frequency of micturition (R35.0). 5. Assessment Urgency of urination (R39.15). 6. Assessment Overactive bladder (N32.81). 7. Assessment History of recurrent urinary tract infection (Z87.440). Additional Visit Information      This [de-identified]year old female presents for Overactive Bladder, UTI and Hematuria. History of Present Illness  1. Overactive Bladder   Onset was gradual. It occurs daily. The problem is improving. Pertinent negatives include chills, constipation and fever. Additional information: pt w Symptoms consistent with overactive bladder including pelvic pressure burning before and after voiding frequency urgency urge incontinence she is currently on Detrol LA 4 mg which he thinks is helping she also thinks that helps better than Myrbetriq however she was on Myrbetriq for short period of time. At this time she will continue with current medications. Aris Gonzalezroyal 02/01/2021  Pt changed To start back on Myrbetriq 25 mg and stopped Detrol. She still having difficulties. I recommend that she restart on Detrol LA in addition to Myrbetriq 25 mg. If no improvement after 2 weeks she should call and let me know and we can increase her to 50 mg.  3/15/2021  Pt has minimal improvement on combination of Myrbetriq 50mg and Detrol LA 4mg. She is moving to [place] in June. We reviewed options including Axonics SNS and Botox  4/9/2021  Pt here for f/u she is here to undergo PNE  06/02/2021  Pt here for f/u she underwent  the Axonics sacral nerve modulator implant on 04/29/2021. She is doing very well. She has only had 1 episode of incontinence which was yesterday. She is having no complaints otherwise. She is pleased with results. 2.  UTI   Pertinent history includes being over 49. Pertinent history does not include diabetes, alcohol consumption or prostatitis.  Pertinent negatives include constipation. Additional information: Patient is here today because she has had 2  urinary tract infections. However both infections grew skin contaminants. It is difficult to determine if this is actually real infection. So today will be obtain a catheterized specimen for culture this will certainly tell us if she is actually having a urinary tract infection or if her specimen was more of a poor specimen given by the patient or for is actually infection. 2/1/2021  Pt appears to have UTI, I will send Keflex 250mg #21.    3.  Hematuria   The patient presents with hematuria (gross). The problem began suddenly. The symptoms are intermittent. The problem has resolved. Pertinent history includes being at least 37years of age but not history of UTIs or prior prostate surgeries. Aggravating factors include blood thinners (Lovenox®). Denies relieving factors. The patient denies chills, fever, nausea and vomiting. Additional information: Pt had gross hematuria, in [place], she was seen by Urologist.  CT scan revealed 1.9 cm mass on the posterior superior wall bladder. Cysto performed revealing a 1.9 cm papillary mass right posterior superior wall the bladder. Her son then was transferred to [address] [place] [address]. Pt then fractured hip when she was driving through [place], 2.5 weeks ago. She was then in rehab and never saw ortho. While in rehab she had gross hematuria while on Lovenox which was.  held  She has not had hematuria since.             Medications (active prior to today)  Medication Instructions Start Date Stop Date Refilled Elsewhere   Fosamax 70 mg tablet take 1 tablet by oral route  every week in the morning, at least 30 min before first food, beverage, or medication of day //   Y   Vitamin D2 1,250 mcg (50,000 unit) capsule take 1 capsule by ORAL route  every month //   Y   Prilosec 2.5 mg oral suspension,delayed release  //   Y   Zocor 10 mg tablet take 1 tablet by oral route  every day in the evening //   Y Lyrica 75 mg capsule take 1 capsule by ORAL route 3 times every day 11/13/2020   N   Centrum Silver 0.4 mg-300 mcg-250 mcg tablet  //   Y   Flonase Allergy Relief 50 mcg/actuation nasal spray,suspension inhale 2 spray by intranasal route  every day in each nostril //   Gilson Lass  //   Y   Detrol LA 4 mg capsule,extended release take 1 capsule by oral route  every day 02/01/2021 02/01/2021 N   Myrbetriq 50 mg tablet,extended release take 1 tablet by oral route  every day swallowing whole with water. Do not crush, chew and/or divide. 06/21/2021 06/21/2021 N         Allergies  Ingredient Reaction (Severity) Medication Name Comment   PENICILLIN Rash         Review of Systems  System Neg/Pos Details   Constitutional Negative Chills and Fever. ENMT Negative Ear infections and Sore throat. Eyes Negative Blurred vision, Double vision and Eye pain. Respiratory Negative Asthma, Chronic cough, Dyspnea and Wheezing. Cardio Negative Chest pain. GI Negative Constipation, Decreased appetite, Diarrhea, Nausea and Vomiting.  Positive Hematuria. Endocrine Negative Cold intolerance, Heat intolerance, Increased thirst and Weight loss. Neuro Negative Headache and Tremors. Psych Negative Anxiety and Depression. Integumentary Negative Itching skin and Rash. MS Negative Back pain and Joint pain. Hema/Lymph Negative Easy bleeding. Physical Exam  Exam Findings Details   Constitutional Normal Well developed. Neck Exam Normal Inspection - Normal.   Respiratory Normal Inspection - Normal.   Abdomen Normal No abdominal tenderness. Genitourinary * Pelvic deferred. Genitourinary Normal No Suprapubic tenderness. No CVA tenderness. No Flank mass   Psychiatric Normal Orientation - Oriented to time, place, person & situation. Appropriate mood and affect.      Medications (added, continued, or stopped today)  Start Date Medication Directions PRN Status PRN Reason Instruction Stop Date    AREDS2 MISCELL MISCELL  N       Centrum Silver 0.4 mg-300 mcg-250 mcg tablet  N      02/01/2021 Detrol LA 4 mg capsule,extended release take 1 capsule by oral route  every day N       Flonase Allergy Relief 50 mcg/actuation nasal spray,suspension inhale 2 spray by intranasal route  every day in each nostril N       Fosamax 70 mg tablet take 1 tablet by oral route  every week in the morning, at least 30 min before first food, beverage, or medication of day N      11/13/2020 Lyrica 75 mg capsule take 1 capsule by ORAL route 3 times every day N      06/21/2021 Myrbetriq 50 mg tablet,extended release take 1 tablet by oral route  every day swallowing whole with water. Do not crush, chew and/or divide. N       Prilosec 2.5 mg oral suspension,delayed release  N       Vitamin D2 1,250 mcg (50,000 unit) capsule take 1 capsule by ORAL route  every month N       Zocor 10 mg tablet take 1 tablet by oral route  every day in the evening N          Active Patient Care Team Members  Name Contact Agency Type Support Role Relationship Active Date Inactive Date [facility] ([facility])   Emergency Contact Child, Mother is the Patient      Óscar Rico   Patient provider PCP          Provider:    Noy Hendricks MD 11/03/2021 8:07 AM     Document generated by:  Denver Later. Darby 11/03/2021 08:07 AM      ----------------------------------------------------------------------------------------------------------------------------------------------------------------------

## 2021-11-04 ENCOUNTER — HOSPITAL ENCOUNTER (OUTPATIENT)
Age: 81
Setting detail: OUTPATIENT SURGERY
Discharge: HOME | End: 2021-11-04
Attending: UROLOGY | Admitting: UROLOGY
Payer: MEDICARE

## 2021-11-04 ENCOUNTER — ANESTHESIA EVENT (OUTPATIENT)
Dept: SURGERY | Age: 81
End: 2021-11-04
Payer: MEDICARE

## 2021-11-04 ENCOUNTER — ANESTHESIA (OUTPATIENT)
Dept: SURGERY | Age: 81
End: 2021-11-04
Payer: MEDICARE

## 2021-11-04 VITALS
BODY MASS INDEX: 24.72 KG/M2 | SYSTOLIC BLOOD PRESSURE: 110 MMHG | HEART RATE: 66 BPM | HEIGHT: 63 IN | TEMPERATURE: 97.7 F | RESPIRATION RATE: 16 BRPM | WEIGHT: 139.5 LBS | DIASTOLIC BLOOD PRESSURE: 51 MMHG | OXYGEN SATURATION: 96 %

## 2021-11-04 PROCEDURE — 77030012508 HC MSK AIRWY LMA AMBU -A: Performed by: ANESTHESIOLOGY

## 2021-11-04 PROCEDURE — 74011250637 HC RX REV CODE- 250/637: Performed by: UROLOGY

## 2021-11-04 PROCEDURE — 88309 TISSUE EXAM BY PATHOLOGIST: CPT

## 2021-11-04 PROCEDURE — 74011000250 HC RX REV CODE- 250: Performed by: NURSE ANESTHETIST, CERTIFIED REGISTERED

## 2021-11-04 PROCEDURE — 76010000138 HC OR TIME 0.5 TO 1 HR: Performed by: UROLOGY

## 2021-11-04 PROCEDURE — 77030040361 HC SLV COMPR DVT MDII -B: Performed by: UROLOGY

## 2021-11-04 PROCEDURE — 74011250636 HC RX REV CODE- 250/636: Performed by: NURSE ANESTHETIST, CERTIFIED REGISTERED

## 2021-11-04 PROCEDURE — 77030040831 HC BAG URINE DRNG MDII -A: Performed by: UROLOGY

## 2021-11-04 PROCEDURE — 77030020782 HC GWN BAIR PAWS FLX 3M -B: Performed by: UROLOGY

## 2021-11-04 PROCEDURE — 74011250636 HC RX REV CODE- 250/636: Performed by: UROLOGY

## 2021-11-04 PROCEDURE — 88307 TISSUE EXAM BY PATHOLOGIST: CPT

## 2021-11-04 PROCEDURE — 2709999900 HC NON-CHARGEABLE SUPPLY: Performed by: UROLOGY

## 2021-11-04 PROCEDURE — 76210000024 HC REC RM PH II 2.5 TO 3 HR: Performed by: UROLOGY

## 2021-11-04 PROCEDURE — 74011250637 HC RX REV CODE- 250/637: Performed by: ANESTHESIOLOGY

## 2021-11-04 PROCEDURE — 77030034696 HC CATH URETH FOL 2W BARD -A: Performed by: UROLOGY

## 2021-11-04 PROCEDURE — 76210000006 HC OR PH I REC 0.5 TO 1 HR: Performed by: UROLOGY

## 2021-11-04 PROCEDURE — 76060000032 HC ANESTHESIA 0.5 TO 1 HR: Performed by: UROLOGY

## 2021-11-04 RX ORDER — ATROPA BELLADONNA AND OPIUM 16.2; 3 MG/1; MG/1
SUPPOSITORY RECTAL AS NEEDED
Status: DISCONTINUED | OUTPATIENT
Start: 2021-11-04 | End: 2021-11-04 | Stop reason: HOSPADM

## 2021-11-04 RX ORDER — HYDROCODONE BITARTRATE AND ACETAMINOPHEN 5; 325 MG/1; MG/1
1 TABLET ORAL
Status: COMPLETED | OUTPATIENT
Start: 2021-11-04 | End: 2021-11-04

## 2021-11-04 RX ORDER — SODIUM CHLORIDE, SODIUM LACTATE, POTASSIUM CHLORIDE, CALCIUM CHLORIDE 600; 310; 30; 20 MG/100ML; MG/100ML; MG/100ML; MG/100ML
125 INJECTION, SOLUTION INTRAVENOUS CONTINUOUS
Status: DISCONTINUED | OUTPATIENT
Start: 2021-11-04 | End: 2021-11-04 | Stop reason: HOSPADM

## 2021-11-04 RX ORDER — LIDOCAINE HYDROCHLORIDE 20 MG/ML
INJECTION, SOLUTION EPIDURAL; INFILTRATION; INTRACAUDAL; PERINEURAL AS NEEDED
Status: DISCONTINUED | OUTPATIENT
Start: 2021-11-04 | End: 2021-11-04 | Stop reason: HOSPADM

## 2021-11-04 RX ORDER — SODIUM CHLORIDE, SODIUM LACTATE, POTASSIUM CHLORIDE, CALCIUM CHLORIDE 600; 310; 30; 20 MG/100ML; MG/100ML; MG/100ML; MG/100ML
25 INJECTION, SOLUTION INTRAVENOUS CONTINUOUS
Status: DISCONTINUED | OUTPATIENT
Start: 2021-11-04 | End: 2021-11-04 | Stop reason: HOSPADM

## 2021-11-04 RX ORDER — FUROSEMIDE 10 MG/ML
INJECTION INTRAMUSCULAR; INTRAVENOUS AS NEEDED
Status: DISCONTINUED | OUTPATIENT
Start: 2021-11-04 | End: 2021-11-04 | Stop reason: HOSPADM

## 2021-11-04 RX ORDER — FENTANYL CITRATE 50 UG/ML
INJECTION, SOLUTION INTRAMUSCULAR; INTRAVENOUS AS NEEDED
Status: DISCONTINUED | OUTPATIENT
Start: 2021-11-04 | End: 2021-11-04 | Stop reason: HOSPADM

## 2021-11-04 RX ORDER — PROPOFOL 10 MG/ML
INJECTION, EMULSION INTRAVENOUS AS NEEDED
Status: DISCONTINUED | OUTPATIENT
Start: 2021-11-04 | End: 2021-11-04 | Stop reason: HOSPADM

## 2021-11-04 RX ORDER — LEVOFLOXACIN 5 MG/ML
500 INJECTION, SOLUTION INTRAVENOUS ONCE
Status: COMPLETED | OUTPATIENT
Start: 2021-11-04 | End: 2021-11-04

## 2021-11-04 RX ORDER — ACETAMINOPHEN AND DIPHENHYDRAMINE HYDROCHLORIDE 500; 25 MG/1; MG/1
TABLET, FILM COATED ORAL
COMMUNITY
End: 2021-12-09 | Stop reason: CLARIF

## 2021-11-04 RX ORDER — ONDANSETRON 2 MG/ML
INJECTION INTRAMUSCULAR; INTRAVENOUS AS NEEDED
Status: DISCONTINUED | OUTPATIENT
Start: 2021-11-04 | End: 2021-11-04 | Stop reason: HOSPADM

## 2021-11-04 RX ORDER — FENTANYL CITRATE 50 UG/ML
12.5 INJECTION, SOLUTION INTRAMUSCULAR; INTRAVENOUS AS NEEDED
Status: DISCONTINUED | OUTPATIENT
Start: 2021-11-04 | End: 2021-11-04 | Stop reason: HOSPADM

## 2021-11-04 RX ORDER — HYDROMORPHONE HYDROCHLORIDE 2 MG/ML
0.2 INJECTION, SOLUTION INTRAMUSCULAR; INTRAVENOUS; SUBCUTANEOUS
Status: DISCONTINUED | OUTPATIENT
Start: 2021-11-04 | End: 2021-11-04 | Stop reason: HOSPADM

## 2021-11-04 RX ORDER — ONDANSETRON 2 MG/ML
4 INJECTION INTRAMUSCULAR; INTRAVENOUS ONCE
Status: DISCONTINUED | OUTPATIENT
Start: 2021-11-04 | End: 2021-11-04 | Stop reason: HOSPADM

## 2021-11-04 RX ADMIN — LIDOCAINE HYDROCHLORIDE 40 MG: 20 INJECTION, SOLUTION INTRAVENOUS at 10:55

## 2021-11-04 RX ADMIN — PROPOFOL 30 MG: 10 INJECTION, EMULSION INTRAVENOUS at 10:56

## 2021-11-04 RX ADMIN — PROPOFOL 80 MG: 10 INJECTION, EMULSION INTRAVENOUS at 10:55

## 2021-11-04 RX ADMIN — SODIUM CHLORIDE, POTASSIUM CHLORIDE, SODIUM LACTATE AND CALCIUM CHLORIDE 125 ML/HR: 600; 310; 30; 20 INJECTION, SOLUTION INTRAVENOUS at 09:31

## 2021-11-04 RX ADMIN — FENTANYL CITRATE 25 MCG: 50 INJECTION, SOLUTION INTRAMUSCULAR; INTRAVENOUS at 10:59

## 2021-11-04 RX ADMIN — FENTANYL CITRATE 25 MCG: 50 INJECTION, SOLUTION INTRAMUSCULAR; INTRAVENOUS at 11:03

## 2021-11-04 RX ADMIN — HYDROCODONE BITARTRATE AND ACETAMINOPHEN 1 TABLET: 5; 325 TABLET ORAL at 14:11

## 2021-11-04 RX ADMIN — ONDANSETRON HYDROCHLORIDE 4 MG: 2 INJECTION INTRAMUSCULAR; INTRAVENOUS at 11:21

## 2021-11-04 RX ADMIN — LEVOFLOXACIN 500 MG: 5 INJECTION, SOLUTION INTRAVENOUS at 10:47

## 2021-11-04 RX ADMIN — FENTANYL CITRATE 25 MCG: 50 INJECTION, SOLUTION INTRAMUSCULAR; INTRAVENOUS at 11:22

## 2021-11-04 RX ADMIN — FUROSEMIDE 10 MG: 10 INJECTION, SOLUTION INTRAVENOUS at 11:19

## 2021-11-04 NOTE — OP NOTES
Postoperative Note    Patient: Sadaf Maciel  YOB: 1940  MRN: [id number]    Date of Procedure: 11/4/2021     Pre-Op Diagnosis: NEOPLASM UNCERTAIN BEHAVIOR BLADDER    Post-Op Diagnosis: Same as preoperative diagnosis. Procedure(s):  CYSTOSCOPY TRANSURETHRAL RESECTION OF BLADDER TUMOR    Surgeon(s):  Henrry Nelson MD    Surgical Assistant: Surg Asst-1: Claybon Res    Anesthesia: Other     Estimated Blood Loss (mL): Minimal    Complications: None    Specimens:   ID Type Source Tests Collected by Time Destination   1 : bladder tumor Preservative Bladder  Henrry Nelson MD 11/4/2021 1126 Pathology        Implants: * No implants in log *    Drains: * No LDAs found *    Findings: 1.3 cm papillary tumor floor      Procedure Details: The patient was brought in the operating room and placed in the supine position. After the administration of general anesthesia, was placed in lithotomy position. At this point, I then began by placing a 21-Peruvian cystoscope into the bladder. Cystourethroscopy was performed. The LEFT ureteral orifices  normal I and the RIGHT normal.  I identified the tumor, which was papillary, located posterior base. I removed the cystoscope and then placed a 26-Peruvian resectoscope into the bladder. Using the resection loop, I began to resect the entire tumor, down to muscle. Once there was no evidence of any obvious residual tumor, I cauterized the entire base of the tumor. There were no residual lesions. There was no evidence of any obvious bleeding at this point. Both ureteral orifices remained intact, The patient's bladder was then emptied and 18 Peruvian Ceballos catheter was placed into the bladder clear reflux of urine was obtained. Patient was then extubated and transferred to recovery room in stable condition.

## 2021-11-04 NOTE — ACP (ADVANCE CARE PLANNING)
Advance Care Planning   Advance Care Planning Inpatient Note  [facility] Department    Today's Date: 11/4/2021  Unit: [facility]    Received request from patient. Upon review of chart and communication with care team, patient's decision making abilities are not in question. Patient was/were present in the room during visit.     Goals of ACP Conversation:  Discuss Advance Care planning documents    Health Care Decision Makers:    Primary decision maker is Marsha Ojeda [phone]  Secondary Decision maker is Tri Astudillo [phone]  Summary:  Completed New Documents    Advance Care Planning Documents (Patient Wishes) on file:  Healthcare Power of /Advance Directive appointment of Health care agent     Interventions:  Assisted in the completion of documents according to patient's wishes at this time    Care Preferences Communicated:  No    Outcomes/Plan:  New Advance Directive completed    Chaplain Fabián on 11/4/2021 at 10:30 AM

## 2021-11-04 NOTE — PERIOP NOTES
I have reviewed discharge instructions with the patient and caregiver. The patient and caregiver verbalized understanding.  Reviewed howto empty julian bag with pt and family

## 2021-11-04 NOTE — PERIOP NOTES
Patient came from [place] [address]. Spoke with Itz Bill RN and confirmed medications and time of last dose.  Son, Sobeida Hinson, accompanied patient to hospital.

## 2021-11-04 NOTE — PERIOP NOTES
Updated Meri Sheppard at [facility] per [facility] request

## 2021-11-04 NOTE — PERIOP NOTES
States relief from pain - tolerating po fluids and crackers - hard copies of Rx's to pts daughter with Order sheet and discharge instructions for [facility]

## 2021-11-04 NOTE — INTERVAL H&P NOTE
Update History & Physical    The Patient's History and Physical of November 2, 2021 was reviewed with the patient and I examined the patient. There was no change. The surgical site was confirmed by the patient and me. Plan:  The risk, benefits, expected outcome, and alternative to the recommended procedure have been discussed with the patient. Patient understands and wants to proceed with the procedure.     Electronically signed by Noy Hendricks MD on 11/4/2021 at 10:25 AM

## 2021-11-04 NOTE — PERIOP NOTES
Phone call from NP at [facility] - if Rx's and order sheet not faxed to facility - they will not be able to accept her back as a patient- contacted SINCERE Trinidad RN - okay to fax info to facility. 2 Rx's and order sheet faxed to facility # [phone] as requested. ( obtained verbal consent from pt and daughter in law to allow fax) - will plan for discharge

## 2021-11-04 NOTE — PERIOP NOTES
Pt assisted to BR - had small BM - releasing air - c/o low abd discomfort - medicated with Norco 5/325 mg po x 1 as ordered per Dr. Robby Bautista for pain

## 2021-11-04 NOTE — ANESTHESIA PREPROCEDURE EVALUATION
Relevant Problems   No relevant active problems       Anesthetic History   No history of anesthetic complications            Review of Systems / Medical History  Patient summary reviewed, nursing notes reviewed and pertinent labs reviewed    Pulmonary              Pertinent negatives: No smoker  Comments: Ex smoker   Neuro/Psych              Cardiovascular              Hyperlipidemia  Pertinent negatives: No hypertension       GI/Hepatic/Renal     GERD: well controlled           Endo/Other        Arthritis  Pertinent negatives: No diabetes   Other Findings   Comments: Left hip fx 6 weeks ago; healing spontaneously           Physical Exam    Airway  Mallampati: II  TM Distance: < 4 cm  Neck ROM: normal range of motion        Cardiovascular    Rhythm: regular  Rate: normal         Dental    Dentition: Edentulous     Pulmonary  Breath sounds clear to auscultation               Abdominal  GI exam deferred       Other Findings            Anesthetic Plan    ASA: 2  Anesthesia type: general          Induction: Intravenous  Anesthetic plan and risks discussed with: Patient

## 2021-11-04 NOTE — INTERVAL H&P NOTE
Update History & Physical    The Patient's History and Physical of November 2, 2021 was reviewed with the patient and I examined the patient. There was no change. The surgical site was confirmed by the patient and me. Plan:  The risk, benefits, expected outcome, and alternative to the recommended procedure have been discussed with the patient. Patient understands and wants to proceed with the procedure.     Electronically signed by Jeremy Juárez MD on 11/4/2021 at 9:23 AM

## 2021-11-04 NOTE — ANESTHESIA POSTPROCEDURE EVALUATION
Post-Anesthesia Evaluation & Assessment    Visit Vitals  BP (!) 110/53   Pulse 69   Temp 36.8 °C (98.3 °F)   Resp 17   Ht 5' 3\" (1.6 m)   Wt 63.3 kg (139 lb 8 oz)   SpO2 97%   BMI 24.71 kg/m²       Nausea/Vomiting: no nausea and no vomiting    Post-operative hydration adequate. Pain score (VAS): 0    Mental status & Level of consciousness: alert and oriented x 3    Neurological status: moves all extremities, sensation grossly intact    Pulmonary status: airway patent, no supplemental oxygen required    Complications related to anesthesia: none    Patient has met all discharge requirements. Additional comments:  Procedure(s):  CYSTOSCOPY TRANSURETHRAL RESECTION OF BLADDER TUMOR.    general    <BSHSIANPOST>    INITIAL Post-op Vital signs:   Vitals Value Taken Time   /68 11/04/21 1230   Temp 36.8 °C (98.3 °F) 11/04/21 1213   Pulse 70 11/04/21 1233   Resp 17 11/04/21 1154   SpO2 97 % 11/04/21 1233   Vitals shown include unvalidated device data.

## 2021-11-04 NOTE — DISCHARGE INSTRUCTIONS
Soco Zhong. Prabha Baxter M.D. [facility]  [address], [address] [address], [address]  Office: [phone]  Fax:    [phone]: Procedure(s):  [address] [facility] Urology IMMEDIATELY if any of the following occur:     You are unable to urinate. Urgency to urinate is not uncommon.  You find yourself urinating small frequent amounts associated with severe lower abdominal discomfort.  Bright red blood with clots in the urine. Some reddish urine is not uncommon and should be treated with increasing the amount of fluids you drink.  Temperature above 101.5° and / or chills.  You are nauseous and / or vomiting and you cannot hold down any fluids.  Your pain is not controlled with the pain medication prescribed. Special Considerations:      Do not drive for at least 24 hours after the procedure and until you are no longer taking narcotic pain medication and you are able to move and react without hesitation. MEDICATIONS:  Pain   []  Norco®   []  Percocet® []  Dilaudid®    [x]  Tramadol   Antibiotics   []  Cipro   [x]  Keflex    [] Levaquin   []  Bactrim DS®       Urination   []  Vesicare®   []  Flomax     Burning   []  Pyridium®   []  UribelTM     Nausea   []  Zofran®   []  Phenergan®     Miscellaneous   []           [] Prescriptions Written on Chart    [x] Prescriptions sent Electronically           Our office will call you tomorrow to schedule your first follow-up appointment. Please contact [facility]. Urology at [phone] or go to the nearest Emergency Department / Urgent Care facility for any other medical questions or concerns.         DISCHARGE SUMMARY from Nurse    PATIENT INSTRUCTIONS:    After general anesthesia or intravenous sedation, for 24 hours or while taking prescription Narcotics:  · Limit your activities  · Do not drive and operate hazardous machinery  · Do not make important personal or business decisions  · Do  not drink alcoholic beverages  · If you have not urinated within 8 hours after discharge, please contact your surgeon on call. Report the following to your surgeon:  · Excessive pain, swelling, redness or odor of or around the surgical area  · Temperature over 100.5  · Nausea and vomiting lasting longer than 4 hours or if unable to take medications  · Any signs of decreased circulation or nerve impairment to extremity: change in color, persistent  numbness, tingling, coldness or increase pain  · Any questions    What to do at Home:  Recommended activity: Ambulate in house and No driving while on analgesics,     If you experience any of the following symptoms above, please follow up with Dr. Jayjay Looney. *  Please give a list of your current medications to your Primary Care Provider. *  Please update this list whenever your medications are discontinued, doses are      changed, or new medications (including over-the-counter products) are added. *  Please carry medication information at all times in case of emergency situations. These are general instructions for a healthy lifestyle:    No smoking/ No tobacco products/ Avoid exposure to second hand smoke  Surgeon General's Warning:  Quitting smoking now greatly reduces serious risk to your health. Obesity, smoking, and sedentary lifestyle greatly increases your risk for illness    A healthy diet, regular physical exercise & weight monitoring are important for maintaining a healthy lifestyle    You may be retaining fluid if you have a history of heart failure or if you experience any of the following symptoms:  Weight gain of 3 pounds or more overnight or 5 pounds in a week, increased swelling in our hands or feet or shortness of breath while lying flat in bed. Please call your doctor as soon as you notice any of these symptoms; do not wait until your next office visit.     Patient armband removed and shredded    The discharge information has been reviewed with the patient and caregiver. The patient and caregiver verbalized understanding. Discharge medications reviewed with the patient and caregiver and appropriate educational materials and side effects teaching were provided. Learning About COVID-19 and Social Distancing  What is it? Social distancing means putting space between yourself and other people. The recommended distance is 6 feet, or about 2 meters. This also means staying away from any place where people may gather, such as pe[place] or other public gathering places. Why is it important? Social distancing is the best way to reduce the spread of COVID-19. This virus seems to spread from person to person through droplets from coughing and sneezing. So if you keep your distance from others, you're less likely to get it or spread it. And social distancing is important for everyone, not just those who are at high risk of infection, like older people. You might have the virus but not have symptoms. You could then give the infection to someone you come into contact with. How is it done? Putting 6 feet, or about 2 meters, between you and other people is the recommended distance. Also stay away from any place where people may gather, such as [place] or other public gathering places. So if possible:  · Work from home, and keep your kids at home. · Don't travel if you don't have to. And avoid public transportation, ride-shares, and taxis unless you have no choice. · Limit shopping to essentials, like food and medicines. · Wear a cloth face cover if you have to go to a public place like the grocery store or pharmacy. · Don't eat in restaurants. (You can still get takeout or food deliveries.)  · Avoid crowds and busy places. Follow stay-at-home orders or other directions for your area. Where can you learn more?   Go to http://www.gray.com/  Enter A133 in the search box to learn more about \"Learning About COVID-19 and Social Distancing. \"  Current as of: December 18, 2020               Content Version: 12.8  © 4241-9289 [employer]. Care instructions adapted under license by [employer] (which disclaims liability or warranty for this information). If you have questions about a medical condition or this instruction, always ask your healthcare professional. [address] any warranty or liability for your use of this information.       ___________________________________________________________________________________________________________________________________

## 2021-11-04 NOTE — PERIOP NOTES
Reviewed PTA medication list with patient/caregiver and patient/caregiver denies any additional medications. Patient admits to having a responsible adult care for them at home for at least 24 hours after surgery. Patient encouraged to use gown warming system and informed that using said warming gown to regulate body temperature prior to a procedure has been shown to help reduce the risks of blood clots and infection. Spoke with Sheridan Camarena RN in holding and asked her to make Dr Bernie Izquierdo aware patient needs a hard script to bring to facility to get filled and that patient fell 6 weeks ago and has a slight fracture to left femur. Dual skin assessment & fall risk band verification completed with A Teddy ELLIS.

## 2021-12-09 ENCOUNTER — HOSPITAL ENCOUNTER (OUTPATIENT)
Dept: PREADMISSION TESTING | Age: 81
Discharge: HOME | End: 2021-12-09

## 2021-12-09 VITALS — WEIGHT: 132 LBS | HEIGHT: 63 IN | BODY MASS INDEX: 23.39 KG/M2

## 2021-12-09 RX ORDER — LEVOFLOXACIN 5 MG/ML
500 INJECTION, SOLUTION INTRAVENOUS ONCE
Status: CANCELLED | OUTPATIENT
Start: 2021-12-16 | End: 2021-12-16

## 2021-12-09 RX ORDER — ACETAMINOPHEN 500 MG
500 TABLET ORAL
COMMUNITY

## 2021-12-09 RX ORDER — SODIUM CHLORIDE, SODIUM LACTATE, POTASSIUM CHLORIDE, CALCIUM CHLORIDE 600; 310; 30; 20 MG/100ML; MG/100ML; MG/100ML; MG/100ML
125 INJECTION, SOLUTION INTRAVENOUS CONTINUOUS
Status: CANCELLED | OUTPATIENT
Start: 2021-12-09

## 2021-12-09 RX ORDER — IBUPROFEN 200 MG
TABLET ORAL
COMMUNITY

## 2021-12-09 RX ORDER — ALENDRONATE SODIUM 70 MG/1
TABLET ORAL
COMMUNITY

## 2021-12-09 RX ORDER — HYDROCODONE BITARTRATE AND ACETAMINOPHEN 5; 325 MG/1; MG/1
TABLET ORAL
COMMUNITY

## 2021-12-09 NOTE — PERIOP NOTES
Pt prefers pharmacy at [facility] in [place]; fax [phone]. Leave all valuables at home or loved ones;to include wallets/purse, money/credit cards, electronics  such as laptops and tablets. If you want to have your prescriptions filled here, please have some form of payment with your . Please arrange for your transportation home Denies any prosthetics. Patient states family physician is aware of upcoming procedure/surgery. Stop nsaids 7 days prior to [place]. Do not put any lotion, jewelry, makeup, fingernail or toenail polish; no wigs, no private piercings; no tictac,gum and mouthwash. Denies sleep apnea. Denies shortness of breath nor chest pain while climbing stairs. Stan Snellen, caregiver at [address] [place] [address] assisted living.  No dnr
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
## 2021-12-16 ENCOUNTER — HOSPITAL ENCOUNTER (OUTPATIENT)
Age: 81
Setting detail: OUTPATIENT SURGERY
Discharge: HOME | End: 2021-12-16
Attending: UROLOGY | Admitting: UROLOGY
Payer: MEDICARE

## 2021-12-16 ENCOUNTER — ANESTHESIA EVENT (OUTPATIENT)
Dept: SURGERY | Age: 81
End: 2021-12-16
Payer: MEDICARE

## 2021-12-16 ENCOUNTER — ANESTHESIA (OUTPATIENT)
Dept: SURGERY | Age: 81
End: 2021-12-16
Payer: MEDICARE

## 2021-12-16 VITALS
TEMPERATURE: 98.2 F | HEART RATE: 71 BPM | SYSTOLIC BLOOD PRESSURE: 106 MMHG | BODY MASS INDEX: 25.37 KG/M2 | DIASTOLIC BLOOD PRESSURE: 52 MMHG | OXYGEN SATURATION: 97 % | RESPIRATION RATE: 16 BRPM | WEIGHT: 143.2 LBS | HEIGHT: 63 IN

## 2021-12-16 PROCEDURE — 74011250636 HC RX REV CODE- 250/636: Performed by: NURSE ANESTHETIST, CERTIFIED REGISTERED

## 2021-12-16 PROCEDURE — 77030012508 HC MSK AIRWY LMA AMBU -A: Performed by: ANESTHESIOLOGY

## 2021-12-16 PROCEDURE — 77030040361 HC SLV COMPR DVT MDII -B: Performed by: UROLOGY

## 2021-12-16 PROCEDURE — 76210000026 HC REC RM PH II 1 TO 1.5 HR: Performed by: UROLOGY

## 2021-12-16 PROCEDURE — 76060000032 HC ANESTHESIA 0.5 TO 1 HR: Performed by: UROLOGY

## 2021-12-16 PROCEDURE — 77030040831 HC BAG URINE DRNG MDII -A: Performed by: UROLOGY

## 2021-12-16 PROCEDURE — 74011000258 HC RX REV CODE- 258: Performed by: UROLOGY

## 2021-12-16 PROCEDURE — 88309 TISSUE EXAM BY PATHOLOGIST: CPT

## 2021-12-16 PROCEDURE — 76010000138 HC OR TIME 0.5 TO 1 HR: Performed by: UROLOGY

## 2021-12-16 PROCEDURE — 74011250636 HC RX REV CODE- 250/636: Performed by: UROLOGY

## 2021-12-16 PROCEDURE — 2709999900 HC NON-CHARGEABLE SUPPLY: Performed by: UROLOGY

## 2021-12-16 PROCEDURE — 88307 TISSUE EXAM BY PATHOLOGIST: CPT

## 2021-12-16 PROCEDURE — 77030020782 HC GWN BAIR PAWS FLX 3M -B: Performed by: UROLOGY

## 2021-12-16 PROCEDURE — 74011000250 HC RX REV CODE- 250: Performed by: NURSE ANESTHETIST, CERTIFIED REGISTERED

## 2021-12-16 PROCEDURE — 76210000006 HC OR PH I REC 0.5 TO 1 HR: Performed by: UROLOGY

## 2021-12-16 PROCEDURE — 77030034696 HC CATH URETH FOL 2W BARD -A: Performed by: UROLOGY

## 2021-12-16 RX ORDER — NALOXONE HYDROCHLORIDE 0.4 MG/ML
0.1 INJECTION, SOLUTION INTRAMUSCULAR; INTRAVENOUS; SUBCUTANEOUS AS NEEDED
Status: DISCONTINUED | OUTPATIENT
Start: 2021-12-16 | End: 2021-12-16 | Stop reason: HOSPADM

## 2021-12-16 RX ORDER — SODIUM CHLORIDE 0.9 % (FLUSH) 0.9 %
5-40 SYRINGE (ML) INJECTION AS NEEDED
Status: DISCONTINUED | OUTPATIENT
Start: 2021-12-16 | End: 2021-12-16 | Stop reason: HOSPADM

## 2021-12-16 RX ORDER — HYDROMORPHONE HYDROCHLORIDE 1 MG/ML
0.2 INJECTION, SOLUTION INTRAMUSCULAR; INTRAVENOUS; SUBCUTANEOUS AS NEEDED
Status: DISCONTINUED | OUTPATIENT
Start: 2021-12-16 | End: 2021-12-16 | Stop reason: HOSPADM

## 2021-12-16 RX ORDER — MAGNESIUM SULFATE 100 %
4 CRYSTALS MISCELLANEOUS AS NEEDED
Status: DISCONTINUED | OUTPATIENT
Start: 2021-12-16 | End: 2021-12-16 | Stop reason: HOSPADM

## 2021-12-16 RX ORDER — ONDANSETRON 2 MG/ML
4 INJECTION INTRAMUSCULAR; INTRAVENOUS ONCE
Status: DISCONTINUED | OUTPATIENT
Start: 2021-12-16 | End: 2021-12-16 | Stop reason: HOSPADM

## 2021-12-16 RX ORDER — PROPOFOL 10 MG/ML
INJECTION, EMULSION INTRAVENOUS AS NEEDED
Status: DISCONTINUED | OUTPATIENT
Start: 2021-12-16 | End: 2021-12-16 | Stop reason: HOSPADM

## 2021-12-16 RX ORDER — HYDROCODONE BITARTRATE AND ACETAMINOPHEN 5; 325 MG/1; MG/1
1 TABLET ORAL AS NEEDED
Status: DISCONTINUED | OUTPATIENT
Start: 2021-12-16 | End: 2021-12-16 | Stop reason: HOSPADM

## 2021-12-16 RX ORDER — INSULIN LISPRO 100 [IU]/ML
INJECTION, SOLUTION INTRAVENOUS; SUBCUTANEOUS ONCE
Status: DISCONTINUED | OUTPATIENT
Start: 2021-12-16 | End: 2021-12-16 | Stop reason: HOSPADM

## 2021-12-16 RX ORDER — ONDANSETRON 2 MG/ML
INJECTION INTRAMUSCULAR; INTRAVENOUS AS NEEDED
Status: DISCONTINUED | OUTPATIENT
Start: 2021-12-16 | End: 2021-12-16 | Stop reason: HOSPADM

## 2021-12-16 RX ORDER — DIPHENHYDRAMINE HYDROCHLORIDE 50 MG/ML
12.5 INJECTION, SOLUTION INTRAMUSCULAR; INTRAVENOUS
Status: DISCONTINUED | OUTPATIENT
Start: 2021-12-16 | End: 2021-12-16 | Stop reason: HOSPADM

## 2021-12-16 RX ORDER — ALBUTEROL SULFATE 0.83 MG/ML
2.5 SOLUTION RESPIRATORY (INHALATION) AS NEEDED
Status: DISCONTINUED | OUTPATIENT
Start: 2021-12-16 | End: 2021-12-16 | Stop reason: HOSPADM

## 2021-12-16 RX ORDER — PHENYLEPHRINE HCL IN 0.9% NACL 1 MG/10 ML
SYRINGE (ML) INTRAVENOUS AS NEEDED
Status: DISCONTINUED | OUTPATIENT
Start: 2021-12-16 | End: 2021-12-16 | Stop reason: HOSPADM

## 2021-12-16 RX ORDER — LIDOCAINE HYDROCHLORIDE 20 MG/ML
INJECTION, SOLUTION EPIDURAL; INFILTRATION; INTRACAUDAL; PERINEURAL AS NEEDED
Status: DISCONTINUED | OUTPATIENT
Start: 2021-12-16 | End: 2021-12-16 | Stop reason: HOSPADM

## 2021-12-16 RX ORDER — DEXTROSE 50 % IN WATER (D50W) INTRAVENOUS SYRINGE
25-50 AS NEEDED
Status: DISCONTINUED | OUTPATIENT
Start: 2021-12-16 | End: 2021-12-16 | Stop reason: HOSPADM

## 2021-12-16 RX ORDER — SODIUM CHLORIDE, SODIUM LACTATE, POTASSIUM CHLORIDE, CALCIUM CHLORIDE 600; 310; 30; 20 MG/100ML; MG/100ML; MG/100ML; MG/100ML
125 INJECTION, SOLUTION INTRAVENOUS CONTINUOUS
Status: DISCONTINUED | OUTPATIENT
Start: 2021-12-16 | End: 2021-12-16 | Stop reason: HOSPADM

## 2021-12-16 RX ORDER — SODIUM CHLORIDE 0.9 % (FLUSH) 0.9 %
5-40 SYRINGE (ML) INJECTION EVERY 8 HOURS
Status: DISCONTINUED | OUTPATIENT
Start: 2021-12-16 | End: 2021-12-16 | Stop reason: HOSPADM

## 2021-12-16 RX ORDER — SODIUM CHLORIDE, SODIUM LACTATE, POTASSIUM CHLORIDE, CALCIUM CHLORIDE 600; 310; 30; 20 MG/100ML; MG/100ML; MG/100ML; MG/100ML
150 INJECTION, SOLUTION INTRAVENOUS CONTINUOUS
Status: DISCONTINUED | OUTPATIENT
Start: 2021-12-16 | End: 2021-12-16 | Stop reason: HOSPADM

## 2021-12-16 RX ORDER — LEVOFLOXACIN 5 MG/ML
500 INJECTION, SOLUTION INTRAVENOUS ONCE
Status: COMPLETED | OUTPATIENT
Start: 2021-12-16 | End: 2021-12-16

## 2021-12-16 RX ORDER — FENTANYL CITRATE 50 UG/ML
25 INJECTION, SOLUTION INTRAMUSCULAR; INTRAVENOUS
Status: DISCONTINUED | OUTPATIENT
Start: 2021-12-16 | End: 2021-12-16 | Stop reason: HOSPADM

## 2021-12-16 RX ORDER — FENTANYL CITRATE 50 UG/ML
INJECTION, SOLUTION INTRAMUSCULAR; INTRAVENOUS AS NEEDED
Status: DISCONTINUED | OUTPATIENT
Start: 2021-12-16 | End: 2021-12-16 | Stop reason: HOSPADM

## 2021-12-16 RX ADMIN — FENTANYL CITRATE 25 MCG: 50 INJECTION, SOLUTION INTRAMUSCULAR; INTRAVENOUS at 08:59

## 2021-12-16 RX ADMIN — GEMCITABINE 1000 MG: 38 INJECTION, SOLUTION INTRAVENOUS at 09:29

## 2021-12-16 RX ADMIN — Medication 50 MCG: at 09:10

## 2021-12-16 RX ADMIN — SODIUM CHLORIDE, POTASSIUM CHLORIDE, SODIUM LACTATE AND CALCIUM CHLORIDE 125 ML/HR: 600; 310; 30; 20 INJECTION, SOLUTION INTRAVENOUS at 08:04

## 2021-12-16 RX ADMIN — LEVOFLOXACIN 500 MG: 5 INJECTION, SOLUTION INTRAVENOUS at 08:59

## 2021-12-16 RX ADMIN — LIDOCAINE HYDROCHLORIDE 40 MG: 20 INJECTION, SOLUTION INTRAVENOUS at 09:06

## 2021-12-16 RX ADMIN — FENTANYL CITRATE 25 MCG: 50 INJECTION, SOLUTION INTRAMUSCULAR; INTRAVENOUS at 09:04

## 2021-12-16 RX ADMIN — Medication 100 MCG: at 09:12

## 2021-12-16 RX ADMIN — ONDANSETRON HYDROCHLORIDE 4 MG: 2 INJECTION INTRAMUSCULAR; INTRAVENOUS at 09:16

## 2021-12-16 RX ADMIN — PROPOFOL 90 MG: 10 INJECTION, EMULSION INTRAVENOUS at 09:06

## 2021-12-16 RX ADMIN — Medication 100 MCG: at 09:22

## 2021-12-16 NOTE — PERIOP NOTES
Pt voided 150 ml light yellow urine - Discharge instructions reviewed with patient and family. Opportunity for questions and answers given. No further questions at this time.    No c/o offered - tolerating po fluids and crackers - will plan for discharge

## 2021-12-16 NOTE — PERIOP NOTES
Patient resides at [facility]. Spoke with nurse, Lana, at facility to review last medication dosages.

## 2021-12-16 NOTE — PERIOP NOTES
Ceballos cath removed after 10 ml balloon was deflated as ordered per Dr. Alfredo Martins - pt tolerated procedure - no c/o voiced - assisted to recliner

## 2021-12-16 NOTE — PERIOP NOTES
Reviewed PTA medication list with patient/caregiver and patient/caregiver denies any additional medications. Patient admits to having a responsible adult care for them at home for at least 24 hours after surgery. Patient encouraged to use gown warming system and informed that using said warming gown to regulate body temperature prior to a procedure has been shown to help reduce the risks of blood clots and infection. Patient's pharmacy of choice verified and documented in PTA medication section. Dual skin assessment & fall risk band verification completed with Ashish Diallo RN. Assessment completed by Ashish Diallo RN.

## 2021-12-16 NOTE — PERIOP NOTES
Dr. Valentin Gaitan aware that son needs paper Rx. For son to bring back to assisted living facility.

## 2021-12-16 NOTE — OP NOTES
Brief Postoperative Note     Patient: Ron Rondon  YOB: 1940  MRN: [id number]     Date of Procedure: 12/16/2021      Pre-Op Diagnosis: BLADDER CANCER     Post-Op Diagnosis: Same as preoperative diagnosis.       Procedure(s):  CYSTOSCOPY TRANSURETHRAL RE-RESECTION OF BLADDER TUMOR WITH GEMCITABINE (SMALL)     Surgeon(s):  Pao Aguilera MD     Surgical Assistant: Surg Asst-1: Cara Skinner     Anesthesia: Other      Estimated Blood Loss (mL): Minimal     Complications: None     Specimens:   ID Type Source Tests Collected by Time Destination   1 : RESECTION OF BLADDER TUMOR Preservative Bladder   Pao Aguilera MD 12/16/2021 8302 Pathology         Implants: * No implants in log *     Drains: * No LDAs found *     Findings: Healing over the site of original resection no evidence of any papillary lesions.     Patient brought the operating room and placed in the supine position. At the administration of general anesthesia, she was placed in the lithotomy position. Groin genitalia were prepped and draped in the usual sterile fashion. I began with a 24 Belgian cystoscope cystourethroscopy was performed. No additional tumors or papillary lesions were seen. Both ureteral orifice ease were normal with clear reflux. There was an area of scarring and healing in the posterior bladder wall from the previous resection. I initially plan on using the loop to resect the area however. I felt it was too small and would most likely wind up with specimen that may not be suitable to be evaluated. Using cold cup biopsy forceps I took multiple pieces from the area. Once all that area was completely removed, I removed the cystoscope and using the resectoscope that I initially planned to use earlier I was able to cauterize the entire surface of the resection. At this point there was no evidence of any active bleeding. The resectoscope was removed and a 25 Belgian Ceballos catheter was placed clear reflux of urine. I injected 200 mg of gemcitabine into the bladder and the catheter was clamped. The patient was then placed in the supine position and extubated. She was transferred to recovery room stable and satisfactory condition.

## 2021-12-16 NOTE — ANESTHESIA POSTPROCEDURE EVALUATION
Procedure(s):  CYSTOSCOPY TRANSURETHRAL RESECTION OF BLADDER TUMOR WITH GEMCITABINE. general    Anesthesia Post Evaluation      Multimodal analgesia: multimodal analgesia not used between 6 hours prior to anesthesia start to PACU discharge  Patient location during evaluation: PACU  Patient participation: complete - patient participated  Level of consciousness: awake  Pain management: adequate  Airway patency: patent  Anesthetic complications: no  Cardiovascular status: acceptable  Respiratory status: acceptable  Hydration status: acceptable  Post anesthesia nausea and vomiting:  none  Final Post Anesthesia Temperature Assessment:  Normothermia (36.0-37.5 degrees C)      INITIAL Post-op Vital signs:   Vitals Value Taken Time   /61 12/16/21 1000   Temp 36.7 °C (98 °F) 12/16/21 1003   Pulse 67 12/16/21 1004   Resp 14 12/16/21 1004   SpO2 98 % 12/16/21 1004   Vitals shown include unvalidated device data.

## 2021-12-16 NOTE — INTERVAL H&P NOTE
Update History & Physical    The Patient's History and Physical of December 13, 2021 was reviewed with the patient and I examined the patient. There was no change. The surgical site was confirmed by the patient and me. Plan:  The risk, benefits, expected outcome, and alternative to the recommended procedure have been discussed with the patient. Patient understands and wants to proceed with the procedure.     Electronically signed by Barbara Elkins MD on 12/16/2021 at 8:46 AM

## 2021-12-16 NOTE — PERIOP NOTES
Arrived to Phase 2 - alert and oriented - eliel unclamped at 1030 - pt states \" I'm wet\" - pad under pt soaked - joshua care completed - only 10 ml gray tinged fluid noted in gravity bag. Notified SINCERE Meyer Peer RN Nurse Navigator to relay message to Dr. Esa Quinonez.

## 2021-12-16 NOTE — BRIEF OP NOTE
Brief Postoperative Note    Patient: Sadaf Maciel  YOB: 1940  MRN: [id number]    Date of Procedure: 12/16/2021     Pre-Op Diagnosis: BLADDER CANCER    Post-Op Diagnosis: Same as preoperative diagnosis. Procedure(s):  CYSTOSCOPY TRANSURETHRAL RE-RESECTION OF BLADDER TUMOR WITH GEMCITABINE (SMALL)    Surgeon(s):  Henrry Nelson MD    Surgical Assistant: Surg Asst-1: Claybon Res    Anesthesia: Other     Estimated Blood Loss (mL): Minimal    Complications: None    Specimens:   ID Type Source Tests Collected by Time Destination   1 : RESECTION OF BLADDER TUMOR Preservative Bladder  Henrry Nelson MD 12/16/2021 1954 Pathology        Implants: * No implants in log *    Drains: * No LDAs found *    Findings: Healing over the site of original resection no evidence of any papillary lesions.     Electronically Signed by Jin Chopra MD on 12/16/2021 at 9:34 AM

## 2021-12-16 NOTE — DISCHARGE INSTRUCTIONS
Juan Diego Lucas. Sp Trevizo M.D. [facility]  [address], [address] [place], [place] [facility]  Office: [phone]  Fax:    [phone]    PROCEDURE: Procedure(s):  [place]    Notify [facility] IMMEDIATELY if any of the following occur:     You are unable to urinate. Urgency to urinate is not uncommon.  You find yourself urinating small frequent amounts associated with severe lower abdominal discomfort.  Bright red blood with clots in the urine. Some reddish urine is not uncommon and should be treated with increasing the amount of fluids you drink.  Temperature above 101.5° and / or chills.  You are nauseous and / or vomiting and you cannot hold down any fluids.  Your pain is not controlled with the pain medication prescribed. Special Considerations:      Do not drive for at least 24 hours after the procedure and until you are no longer taking narcotic pain medication and you are able to move and react without hesitation. MEDICATIONS:  Pain   []  Norco®   []  Percocet® []  Dilaudid®    []  Tramadol   Antibiotics   []  Cipro   [x]  Keflex    [] Levaquin   []  Bactrim DS®       Urination   []  Vesicare®   []  Flomax     Burning   []  Pyridium®   []  UribelTM     Nausea   []  Zofran®   []  Phenergan®     Miscellaneous   []           [x] Prescriptions Written on Chart    [] Prescriptions sent Electronically           Our office will call you tomorrow to schedule your first follow-up appointment. Please contact [facility] [place]. Urology at [phone] or go to the nearest Emergency Department / Urgent Care facility for any other medical questions or concerns. For the next 48 hours:     If possible use separate toilet from others in house  Urinate in toilet - add 1 cup of BLEACH - close  Lid and double flush  Clean urine spills on self with soap and water , urine spills on toilet with Bleach wipes  Wash clothes separate from others in house      [address] from Nurse    PATIENT INSTRUCTIONS:    After general anesthesia or intravenous sedation, for 24 hours or while taking prescription Narcotics:  · Limit your activities  · Do not drive and operate hazardous machinery  · Do not make important personal or business decisions  · Do  not drink alcoholic beverages  · If you have not urinated within 8 hours after discharge, please contact your surgeon on call. Report the following to your surgeon:  · Excessive pain, swelling, redness or odor of or around the surgical area  · Temperature over 100.5  · Nausea and vomiting lasting longer than 4 hours or if unable to take medications  · Any signs of decreased circulation or nerve impairment to extremity: change in color, persistent  numbness, tingling, coldness or increase pain  · Any questions    What to do at Home:  Recommended activity: Ambulate in house and No driving while on analgesics,     If you experience any of the following symptoms above, please follow up with Dr. Yany Rivers. *  Please give a list of your current medications to your Primary Care Provider. *  Please update this list whenever your medications are discontinued, doses are      changed, or new medications (including over-the-counter products) are added. *  Please carry medication information at all times in case of emergency situations. These are general instructions for a healthy lifestyle:    No smoking/ No tobacco products/ Avoid exposure to second hand smoke  Surgeon General's Warning:  Quitting smoking now greatly reduces serious risk to your health.     Obesity, smoking, and sedentary lifestyle greatly increases your risk for illness    A healthy diet, regular physical exercise & weight monitoring are important for maintaining a healthy lifestyle    You may be retaining fluid if you have a history of heart failure or if you experience any of the following symptoms:  Weight gain of 3 pounds or more overnight or 5 pounds in a week, increased swelling in our hands or feet or shortness of breath while lying flat in bed. Please call your doctor as soon as you notice any of these symptoms; do not wait until your next office visit. Patient armband removed and shredded    The discharge information has been reviewed with the patient and caregiver. The patient and caregiver verbalized understanding. Discharge medications reviewed with the patient and caregiver and appropriate educational materials and side effects teaching were provided.   ___________________________________________________________________________________________________________________________________

## 2021-12-16 NOTE — PERIOP NOTES
Thony Burger at [facility] assisted living given report and update on pt - opportunity for questions

## 2023-03-06 ENCOUNTER — HOSPITAL ENCOUNTER (OUTPATIENT)
Facility: HOSPITAL | Age: 83
Discharge: HOME | End: 2023-03-09
Payer: MEDICARE

## 2023-03-06 DIAGNOSIS — C67.4 MALIGNANT NEOPLASM OF POSTERIOR WALL OF URINARY BLADDER (HCC): ICD-10-CM

## 2023-03-06 LAB
ANION GAP SERPL CALC-SCNC: 3 MMOL/L (ref 3–18)
BUN SERPL-MCNC: 14 MG/DL (ref 7–18)
BUN/CREAT SERPL: 22 (ref 12–20)
CALCIUM SERPL-MCNC: 9.1 MG/DL (ref 8.5–10.1)
CHLORIDE SERPL-SCNC: 110 MMOL/L (ref 100–111)
CO2 SERPL-SCNC: 28 MMOL/L (ref 21–32)
CREAT SERPL-MCNC: 0.64 MG/DL (ref 0.6–1.3)
ERYTHROCYTE [DISTWIDTH] IN BLOOD BY AUTOMATED COUNT: 13.5 % (ref 11.6–14.5)
GLUCOSE SERPL-MCNC: 99 MG/DL (ref 74–99)
HCT VFR BLD AUTO: 38.9 % (ref 35–45)
HGB BLD-MCNC: 12.3 G/DL (ref 12–16)
MCH RBC QN AUTO: 28.3 PG (ref 24–34)
MCHC RBC AUTO-ENTMCNC: 31.6 G/DL (ref 31–37)
MCV RBC AUTO: 89.6 FL (ref 78–100)
NRBC # BLD: 0 K/UL (ref 0–0.01)
NRBC BLD-RTO: 0 PER 100 WBC
PLATELET # BLD AUTO: 199 K/UL (ref 135–420)
PMV BLD AUTO: 10 FL (ref 9.2–11.8)
POTASSIUM SERPL-SCNC: 4.3 MMOL/L (ref 3.5–5.5)
RBC # BLD AUTO: 4.34 M/UL (ref 4.2–5.3)
SODIUM SERPL-SCNC: 141 MMOL/L (ref 136–145)
WBC # BLD AUTO: 5.3 K/UL (ref 4.6–13.2)

## 2023-03-06 PROCEDURE — 80048 BASIC METABOLIC PNL TOTAL CA: CPT

## 2023-03-06 PROCEDURE — 36415 COLL VENOUS BLD VENIPUNCTURE: CPT

## 2023-03-06 PROCEDURE — 93005 ELECTROCARDIOGRAM TRACING: CPT

## 2023-03-06 PROCEDURE — 85027 COMPLETE CBC AUTOMATED: CPT

## 2023-03-07 ENCOUNTER — ANESTHESIA EVENT (OUTPATIENT)
Facility: HOSPITAL | Age: 83
End: 2023-03-07
Payer: MEDICARE

## 2023-03-07 LAB
EKG ATRIAL RATE: 71 BPM
EKG DIAGNOSIS: NORMAL
EKG P AXIS: 73 DEGREES
EKG P-R INTERVAL: 192 MS
EKG Q-T INTERVAL: 376 MS
EKG QRS DURATION: 92 MS
EKG QTC CALCULATION (BAZETT): 408 MS
EKG R AXIS: 60 DEGREES
EKG T AXIS: 71 DEGREES
EKG VENTRICULAR RATE: 71 BPM
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
## 2023-03-09 ENCOUNTER — APPOINTMENT (OUTPATIENT)
Facility: HOSPITAL | Age: 83
End: 2023-03-09
Attending: UROLOGY
Payer: MEDICARE

## 2023-03-09 ENCOUNTER — HOSPITAL ENCOUNTER (OUTPATIENT)
Facility: HOSPITAL | Age: 83
Setting detail: OUTPATIENT SURGERY
Discharge: HOME | End: 2023-03-09
Attending: UROLOGY | Admitting: UROLOGY
Payer: MEDICARE

## 2023-03-09 ENCOUNTER — ANESTHESIA (OUTPATIENT)
Facility: HOSPITAL | Age: 83
End: 2023-03-09
Payer: MEDICARE

## 2023-03-09 VITALS
TEMPERATURE: 98.1 F | HEIGHT: 63 IN | BODY MASS INDEX: 30.03 KG/M2 | HEART RATE: 81 BPM | RESPIRATION RATE: 20 BRPM | OXYGEN SATURATION: 97 % | DIASTOLIC BLOOD PRESSURE: 49 MMHG | SYSTOLIC BLOOD PRESSURE: 117 MMHG | WEIGHT: 169.5 LBS

## 2023-03-09 PROCEDURE — 6360000002 HC RX W HCPCS

## 2023-03-09 PROCEDURE — 7100000011 HC PHASE II RECOVERY - ADDTL 15 MIN: Performed by: UROLOGY

## 2023-03-09 PROCEDURE — 2500000003 HC RX 250 WO HCPCS

## 2023-03-09 PROCEDURE — 88120 CYTP URNE 3-5 PROBES EA SPEC: CPT

## 2023-03-09 PROCEDURE — 88341 IMHCHEM/IMCYTCHM EA ADD ANTB: CPT

## 2023-03-09 PROCEDURE — 7100000010 HC PHASE II RECOVERY - FIRST 15 MIN: Performed by: UROLOGY

## 2023-03-09 PROCEDURE — 7100000000 HC PACU RECOVERY - FIRST 15 MIN: Performed by: UROLOGY

## 2023-03-09 PROCEDURE — 2709999900 HC NON-CHARGEABLE SUPPLY: Performed by: UROLOGY

## 2023-03-09 PROCEDURE — 3600000002 HC SURGERY LEVEL 2 BASE: Performed by: UROLOGY

## 2023-03-09 PROCEDURE — 88342 IMHCHEM/IMCYTCHM 1ST ANTB: CPT

## 2023-03-09 PROCEDURE — 88112 CYTOPATH CELL ENHANCE TECH: CPT

## 2023-03-09 PROCEDURE — 3700000001 HC ADD 15 MINUTES (ANESTHESIA): Performed by: UROLOGY

## 2023-03-09 PROCEDURE — 88305 TISSUE EXAM BY PATHOLOGIST: CPT

## 2023-03-09 PROCEDURE — 7100000001 HC PACU RECOVERY - ADDTL 15 MIN: Performed by: UROLOGY

## 2023-03-09 PROCEDURE — 88121 CYTP URINE 3-5 PROBES CMPTR: CPT

## 2023-03-09 PROCEDURE — 3700000000 HC ANESTHESIA ATTENDED CARE: Performed by: UROLOGY

## 2023-03-09 PROCEDURE — 3600000012 HC SURGERY LEVEL 2 ADDTL 15MIN: Performed by: UROLOGY

## 2023-03-09 PROCEDURE — 6360000002 HC RX W HCPCS: Performed by: UROLOGY

## 2023-03-09 PROCEDURE — 2580000003 HC RX 258: Performed by: UROLOGY

## 2023-03-09 RX ORDER — ONDANSETRON 2 MG/ML
4 INJECTION INTRAMUSCULAR; INTRAVENOUS
Status: DISCONTINUED | OUTPATIENT
Start: 2023-03-09 | End: 2023-03-09 | Stop reason: HOSPADM

## 2023-03-09 RX ORDER — FENTANYL CITRATE 50 UG/ML
50 INJECTION, SOLUTION INTRAMUSCULAR; INTRAVENOUS EVERY 5 MIN PRN
Status: DISCONTINUED | OUTPATIENT
Start: 2023-03-09 | End: 2023-03-09 | Stop reason: HOSPADM

## 2023-03-09 RX ORDER — DEXAMETHASONE SODIUM PHOSPHATE 4 MG/ML
INJECTION, SOLUTION INTRA-ARTICULAR; INTRALESIONAL; INTRAMUSCULAR; INTRAVENOUS; SOFT TISSUE PRN
Status: DISCONTINUED | OUTPATIENT
Start: 2023-03-09 | End: 2023-03-11 | Stop reason: SDUPTHER

## 2023-03-09 RX ORDER — ONDANSETRON 2 MG/ML
INJECTION INTRAMUSCULAR; INTRAVENOUS PRN
Status: DISCONTINUED | OUTPATIENT
Start: 2023-03-09 | End: 2023-03-11 | Stop reason: SDUPTHER

## 2023-03-09 RX ORDER — SODIUM CHLORIDE 9 MG/ML
INJECTION, SOLUTION INTRAVENOUS PRN
Status: DISCONTINUED | OUTPATIENT
Start: 2023-03-09 | End: 2023-03-09 | Stop reason: HOSPADM

## 2023-03-09 RX ORDER — EPHEDRINE SULFATE/0.9% NACL/PF 50 MG/5 ML
SYRINGE (ML) INTRAVENOUS PRN
Status: DISCONTINUED | OUTPATIENT
Start: 2023-03-09 | End: 2023-03-11 | Stop reason: SDUPTHER

## 2023-03-09 RX ORDER — ACETAMINOPHEN 500 MG
1000 TABLET ORAL ONCE
Status: DISCONTINUED | OUTPATIENT
Start: 2023-03-09 | End: 2023-03-09 | Stop reason: HOSPADM

## 2023-03-09 RX ORDER — SODIUM CHLORIDE 0.9 % (FLUSH) 0.9 %
5-40 SYRINGE (ML) INJECTION EVERY 12 HOURS SCHEDULED
Status: DISCONTINUED | OUTPATIENT
Start: 2023-03-09 | End: 2023-03-09 | Stop reason: HOSPADM

## 2023-03-09 RX ORDER — DEXTROSE MONOHYDRATE 100 MG/ML
INJECTION, SOLUTION INTRAVENOUS CONTINUOUS PRN
Status: DISCONTINUED | OUTPATIENT
Start: 2023-03-09 | End: 2023-03-09 | Stop reason: HOSPADM

## 2023-03-09 RX ORDER — HYDROMORPHONE HYDROCHLORIDE 1 MG/ML
0.25 INJECTION, SOLUTION INTRAMUSCULAR; INTRAVENOUS; SUBCUTANEOUS EVERY 10 MIN PRN
Status: DISCONTINUED | OUTPATIENT
Start: 2023-03-09 | End: 2023-03-09 | Stop reason: HOSPADM

## 2023-03-09 RX ORDER — OXYCODONE HYDROCHLORIDE 5 MG/1
5 TABLET ORAL PRN
Status: DISCONTINUED | OUTPATIENT
Start: 2023-03-09 | End: 2023-03-09 | Stop reason: HOSPADM

## 2023-03-09 RX ORDER — IPRATROPIUM BROMIDE AND ALBUTEROL SULFATE 2.5; .5 MG/3ML; MG/3ML
1 SOLUTION RESPIRATORY (INHALATION)
Status: DISCONTINUED | OUTPATIENT
Start: 2023-03-09 | End: 2023-03-09 | Stop reason: HOSPADM

## 2023-03-09 RX ORDER — SODIUM CHLORIDE 0.9 % (FLUSH) 0.9 %
5-40 SYRINGE (ML) INJECTION PRN
Status: DISCONTINUED | OUTPATIENT
Start: 2023-03-09 | End: 2023-03-09 | Stop reason: HOSPADM

## 2023-03-09 RX ORDER — LEVOFLOXACIN 5 MG/ML
500 INJECTION, SOLUTION INTRAVENOUS
Status: COMPLETED | OUTPATIENT
Start: 2023-03-09 | End: 2023-03-09

## 2023-03-09 RX ORDER — SODIUM CHLORIDE, SODIUM LACTATE, POTASSIUM CHLORIDE, CALCIUM CHLORIDE 600; 310; 30; 20 MG/100ML; MG/100ML; MG/100ML; MG/100ML
INJECTION, SOLUTION INTRAVENOUS CONTINUOUS
Status: DISCONTINUED | OUTPATIENT
Start: 2023-03-09 | End: 2023-03-09 | Stop reason: HOSPADM

## 2023-03-09 RX ORDER — OXYCODONE HYDROCHLORIDE 5 MG/1
10 TABLET ORAL PRN
Status: DISCONTINUED | OUTPATIENT
Start: 2023-03-09 | End: 2023-03-09 | Stop reason: HOSPADM

## 2023-03-09 RX ORDER — LIDOCAINE HYDROCHLORIDE 20 MG/ML
INJECTION, SOLUTION EPIDURAL; INFILTRATION; INTRACAUDAL; PERINEURAL PRN
Status: DISCONTINUED | OUTPATIENT
Start: 2023-03-09 | End: 2023-03-11 | Stop reason: SDUPTHER

## 2023-03-09 RX ORDER — FENTANYL CITRATE 50 UG/ML
INJECTION, SOLUTION INTRAMUSCULAR; INTRAVENOUS
Status: DISCONTINUED
Start: 2023-03-09 | End: 2023-03-09 | Stop reason: HOSPADM

## 2023-03-09 RX ORDER — FENTANYL CITRATE 50 UG/ML
INJECTION, SOLUTION INTRAMUSCULAR; INTRAVENOUS PRN
Status: DISCONTINUED | OUTPATIENT
Start: 2023-03-09 | End: 2023-03-11 | Stop reason: SDUPTHER

## 2023-03-09 RX ADMIN — DEXAMETHASONE SODIUM PHOSPHATE 4 MG: 4 INJECTION, SOLUTION INTRAMUSCULAR; INTRAVENOUS at 12:08

## 2023-03-09 RX ADMIN — ONDANSETRON HYDROCHLORIDE 4 MG: 2 INJECTION INTRAMUSCULAR; INTRAVENOUS at 12:08

## 2023-03-09 RX ADMIN — LIDOCAINE HYDROCHLORIDE 60 MG: 20 INJECTION, SOLUTION EPIDURAL; INFILTRATION; INTRACAUDAL; PERINEURAL at 12:02

## 2023-03-09 RX ADMIN — FENTANYL CITRATE 25 MCG: 50 INJECTION, SOLUTION INTRAMUSCULAR; INTRAVENOUS at 12:02

## 2023-03-09 RX ADMIN — LEVOFLOXACIN 500 MG: 5 INJECTION, SOLUTION INTRAVENOUS at 12:05

## 2023-03-09 RX ADMIN — FENTANYL CITRATE 25 MCG: 50 INJECTION, SOLUTION INTRAMUSCULAR; INTRAVENOUS at 11:55

## 2023-03-09 RX ADMIN — SODIUM CHLORIDE, POTASSIUM CHLORIDE, SODIUM LACTATE AND CALCIUM CHLORIDE: 600; 310; 30; 20 INJECTION, SOLUTION INTRAVENOUS at 09:52

## 2023-03-09 RX ADMIN — Medication 5 MG: at 12:14

## 2023-03-09 ASSESSMENT — PAIN DESCRIPTION - DESCRIPTORS: DESCRIPTORS: ACHING

## 2023-03-09 ASSESSMENT — PAIN DESCRIPTION - FREQUENCY: FREQUENCY: CONTINUOUS

## 2023-03-09 ASSESSMENT — LIFESTYLE VARIABLES: SMOKING_STATUS: 0

## 2023-03-09 ASSESSMENT — PAIN DESCRIPTION - LOCATION: LOCATION: HIP

## 2023-03-09 ASSESSMENT — PAIN DESCRIPTION - ORIENTATION: ORIENTATION: LEFT

## 2023-03-09 ASSESSMENT — PAIN DESCRIPTION - PAIN TYPE: TYPE: CHRONIC PAIN

## 2023-03-09 ASSESSMENT — PAIN SCALES - GENERAL: PAINLEVEL_OUTOF10: 3

## 2023-03-09 NOTE — NURSE NAVIGATOR
Remote to stimulator brought in by Hernán Baltazar [employer] rep. Gave step by step instructions to turn stimulator off. Remote placed in baggie and taken to PACU. Can be turned on after procedure. Giovanny # [phone].

## 2023-03-09 NOTE — DISCHARGE INSTRUCTIONS
DISCHARGE SUMMARY from Nurse    PATIENT INSTRUCTIONS:    After general anesthesia or intravenous sedation, for 24 hours or while taking prescription Narcotics:  Limit your activities  Do not drive and operate hazardous machinery  Do not make important personal or business decisions  Do  not drink alcoholic beverages  If you have not urinated within 8 hours after discharge, please contact your surgeon on call. Report the following to your surgeon:  Excessive pain, swelling, redness or odor of or around the surgical area  Temperature over 100.5  Nausea and vomiting lasting longer than 4 hours or if unable to take medications  Any signs of decreased circulation or nerve impairment to extremity: change in color, persistent  numbness, tingling, coldness or increase pain  Any questions    What to do at Home:  Recommended activity: activity as tolerated and no driving for today        *  Please give a list of your current medications to your Primary Care Provider. *  Please update this list whenever your medications are discontinued, doses are      changed, or new medications (including over-the-counter products) are added. *  Please carry medication information at all times in case of emergency situations. These are general instructions for a healthy lifestyle:    No smoking/ No tobacco products/ Avoid exposure to second hand smoke  Surgeon General's Warning:  Quitting smoking now greatly reduces serious risk to your health. Obesity, smoking, and sedentary lifestyle greatly increases your risk for illness    A healthy diet, regular physical exercise & weight monitoring are important for maintaining a healthy lifestyle    You may be retaining fluid if you have a history of heart failure or if you experience any of the following symptoms:  Weight gain of 3 pounds or more overnight or 5 pounds in a week, increased swelling in our hands or feet or shortness of breath while lying flat in bed.   Please call your doctor as soon as you notice any of these symptoms; do not wait until your next office visit. The discharge information has been reviewed with the patient and caregiver. The patient and caregiver verbalized understanding. Discharge medications reviewed with the patient and caregiver and appropriate educational materials and side effects teaching were provided. Patient armband removed and shredded   ___________________________________________________________________________________________________________________________________     Cystoscopy: What to Expect at [address]     A cystoscopy is a procedure that lets a doctor look inside of the bladder and the urethra. The urethra is the tube that carries urine from the bladder to outside the body. The doctor uses a thin, lighted tool called a cystoscope. Your bladder is filled with fluid. This stretches the bladder so that your doctor can look closely at the inside of your bladder. After the cystoscopy, your urethra may be sore at first, and it may burn when you urinate for the first few days after the procedure. You may feel the need to urinate more often, and your urine may be pink. These symptoms should get better in 1 or 2 days. You will probably be able to go back to most of your usual activities in 1 or 2 days. This care sheet gives you a general idea about how long it will take for you to recover. But each person recovers at a different pace. Follow the steps below to get better as quickly as possible. How can you care for yourself at home? Activity    Rest when you feel tired. Getting enough sleep will help you recover. Try to walk each day. Start by walking a little more than you did the day before. Bit by bit, increase the amount you walk. Walking boosts blood flow and helps prevent pneumonia and constipation.      Avoid strenuous activities, such as bicycle riding, jogging, weight lifting, or aerobic exercise, until your doctor says it is okay. Ask your doctor when you can drive again. Most people are able to return to work within 1 or 2 days after the procedure. You may shower and take baths as usual.     Ask your doctor when it is okay for you to have sex. Diet    You can eat your normal diet. If your stomach is upset, try bland, low-fat foods like plain rice, broiled chicken, toast, and yogurt. Drink plenty of fluids (unless your doctor tells you not to). Medicines    Take pain medicines exactly as directed. If the doctor gave you a prescription medicine for pain, take it as prescribed. If you are not taking a prescription pain medicine, ask your doctor if you can take an over-the-counter medicine. If you think your pain medicine is making you sick to your stomach: Take your medicine after meals (unless your doctor has told you not to). Ask your doctor for a different pain medicine. If your doctor prescribed antibiotics, take them as directed. Do not stop taking them just because you feel better. You need to take the full course of antibiotics. Follow-up care is a key part of your treatment and safety. Be sure to make and go to all appointments, and call your doctor if you are having problems. It's also a good idea to know your test results and keep a list of the medicines you take. When should you call for help? Call 911 anytime you think you may need emergency care. For example, call if:    You passed out (lost consciousness). You have severe trouble breathing. You have sudden chest pain and shortness of breath, or you cough up blood. You have severe belly pain. Call your doctor now or seek immediate medical care if:    You are sick to your stomach or cannot keep fluids down. Your urine is still red or you see blood clots after you have urinated several times. You have trouble passing urine or stool, especially if you have pain or swelling in your lower belly.      You have signs of a blood clot, such as:  Pain in your calf, back of the knee, thigh, or groin. Redness and swelling in your leg or groin. You develop a fever or severe chills. You have pain in your back just below your rib cage. This is called flank pain. Watch closely for changes in your health, and be sure to contact your doctor if:    You have pain or burning when you urinate. A burning feeling is normal for a day or two after the test, but call if it does not get better. You have a frequent urge to urinate but can pass only small amounts of urine. Your urine is pink, red, or cloudy, or smells bad. It is normal for the urine to have a pinkish color for a few days after the test, but call if it does not get better. Where can you learn more? Go to http://www.woods.com/ and enter C842 to learn more about \"Cystoscopy: What to Expect at Home. \"  Current as of: June 16, 2022               Content Version: 13.5  © 2006-2022 [employer]. Care instructions adapted under license by [facility] ([facility]). If you have questions about a medical condition or this instruction, always ask your healthcare professional. [place] [address] any warranty or liability for your use of this information.

## 2023-03-09 NOTE — PERIOP NOTE
Called [facility] assistant [facility] to verify all medications MS murdock is taking. Tel 3 [phone] - SPOKE to Elaine Polk, med tech.

## 2023-03-09 NOTE — ANESTHESIA POSTPROCEDURE EVALUATION
Department of Anesthesiology  Postprocedure Note    Patient: Radha Mccurdy  MRN: [id number]  YOB: 1940  Date of evaluation: 3/9/2023      Procedure Summary     Date: 03/09/23 Room / Location: [address] MAIN OR    Anesthesia Start: 1156 Anesthesia Stop:     Procedure: CYSTOSCOPY RANDOM BLADDER BIOPSY AND SELECTIVE CYTOLOGY WITH C-ARM \"SPEC POP\" (Bladder) Diagnosis:       Malignant neoplasm of posterior wall of urinary bladder ([address]      (MALIGNANT NEOPLASM OF POSTEROR WALL OF URINARY BLADDER)    Surgeons: Verito Bhagat MD Responsible Provider:     Anesthesia Type: general ASA Status: 2          Anesthesia Type: No value filed.     Danette Phase I: Danette Score: 10    Danette Phase II:        Anesthesia Post Evaluation    Patient location during evaluation: PACU  Patient participation: complete - patient participated  Level of consciousness: awake and alert  Airway patency: patent  Nausea & Vomiting: no nausea and no vomiting  Complications: no  Cardiovascular status: hemodynamically stable  Respiratory status: acceptable  Hydration status: euvolemic

## 2023-03-09 NOTE — INTERVAL H&P NOTE
Update History & Physical    The patient's History and Physical of February 22, 2023 was reviewed with the patient and I examined the patient. There was no change. The surgical site was confirmed by the patient and me. Plan: The risks, benefits, expected outcome, and alternative to the recommended procedure have been discussed with the patient. Patient understands and wants to proceed with the procedure.      Electronically signed by Stephanie Cope MD on 3/9/2023 at 11:29 AM

## 2023-03-09 NOTE — PERIOP NOTE
TRANSFER - IN REPORT:    Verbal report received from [facility]  on [address]  being received from PACU for routine post-op      Report consisted of patient's Situation, Background, Assessment and   Recommendations(SBAR). Information from the following report(s) Nurse Handoff Report, Surgery Report, and MAR was reviewed with the receiving nurse. Opportunity for questions and clarification was provided. Assessment completed upon patient's arrival to unit and care assumed.

## 2023-03-09 NOTE — PERIOP NOTE
TRANSFER - OUT REPORT:    Verbal report given to Angi SULLIVAN  on [employer]  being transferred to OR  for routine progression of patient care       Report consisted of patient's Situation, Background, Assessment and   Recommendations(SBAR). Information from the following report(s) MAR, Recent Results, Med Rec Status, and Cardiac Rhythm    was reviewed with the receiving nurse. [place] Assessment: No data recorded  Lines:   Peripheral IV 03/09/23 Left; Anterior Hand (Active)   Site Assessment Clean, dry & intact; Clean 03/09/23 1240   Line Status Infusing 03/09/23 [address] changed; Tubing changed 03/09/23 0952   Phlebitis Assessment No symptoms 03/09/23 0952   Infiltration Assessment 0 03/09/23 0952   Alcohol Cap Used Yes 03/09/23 0952   Dressing Status New dressing applied 03/09/23 0952   Dressing Type Transparent;Securing device 03/09/23 0952   Dressing Intervention New 03/09/23 0952        Opportunity for questions and clarification was provided.       Patient transported with:  Registered Nurse

## 2023-03-09 NOTE — OP NOTE
Operative Note      Patient: Sanjay Boles  YOB: 1940  MRN: [id number]    Date of Procedure: 3/9/2023    Pre-Op Diagnosis: History of bladder cancer, abnormal cytology    Post-Op Diagnosis: Same       Procedure(s):  CYSTOSCOPY RANDOM BLADDER BIOPSY AND SELECTIVE CYTOLOGY WITH C-ARM \"SPEC POP\"    Surgeon(s):  Pinky Sadler MD    Assistant:   * No surgical staff found *    Anesthesia: General    Estimated Blood Loss (mL): Minimal    Complications: None    Specimens:   ID Type Source Tests Collected by Time Destination   A : BLADDER BIOPSIES Tissue Bladder SURGICAL PATHOLOGY Zackery Peabody, RN 3/9/2023 1215    B : BLADDER WASHING Body Fluid Bladder CYTOLOGY, NON-GYN Pinky Sadler MD 3/9/2023 1216    C : LEFT URETERAL WASHING Body Fluid Ureter CYTOLOGY, NON-GYN Pinky Sadler MD 3/9/2023 1219    D : RIGHT URETERAL WASHING Body Fluid Ureter CYTOLOGY, NON-GYN Pinky Sadler MD 3/9/2023 1219        Implants:  * No implants in log *      Drains: * No LDAs found *    Findings: A few small slightly raised areas posterior right sidewall    Detailed Description of Procedure:   PROCEDURE:    The patient was brought into the operating room and placed in the supine position. After the administration of general anesthesia then replaced in the lithotomy position. The groin and genitalia were prepped and draped in the usual sterile fashion. I began with a 21 Ethiopian cystoscope, cystourethroscopy was performed no abnormalities were seen in the prostatic urethra. Using both a 30 and 70° lens I did not see any tumors or lesions throughout the bladder. I identified both ureteral orifices without difficulty, there was clear reflux of urine from both. I emptied the bladder and then obtained urine cytology specifically from the bladder.   Then using cold cup biopsy forceps I took several biopsies from the right posterior side of the bladder that was slightly raised and the other areas that were slightly red no other abnormalities were noted there was no obvious papillary lesions. I used Bugbee cautery and cauterized the areas of the biopsy. Since a CT scan urogram was just obtained which was normal retrograde pyelograms were not performed. I intubated the patient's left side with a 5 mm open-ended catheter and flushed 10 cc of saline into the collecting system and then collected 20 cc of fluid for cytology same was performed on the right side. At this point there was no evidence of any bleeding or any other findings. I emptied the bladder the patient was then placed back in the supine position extubated and transferred to PACU in stable condition.       Electronically signed by Stephanie Cope MD on 3/9/2023 at 12:37 PM

## 2023-03-09 NOTE — PERIOP NOTE
TRANSFER - IN REPORT:    Verbal report received from [address] Aydee Castanon RN  on [employer]  being received from OR  for routine progression of patient care      Report consisted of patient's Situation, Background, Assessment and   Recommendations(SBAR). Information from the following report(s) Adult Overview, Surgery Report, Intake/Output, and MAR was reviewed with the receiving nurse. Opportunity for questions and clarification was provided. Assessment completed upon patient's arrival to unit and care assumed.

## 2023-03-09 NOTE — PERIOP NOTE
Patient voided x 3. Discharge instructions were reviewed with patient and her son. All questions  were answered.

## 2023-03-09 NOTE — PERIOP NOTE
Reviewed PTA medication list with patient/caregiver and patient/caregiver denies any additional medications. Patient admits to having a responsible adult care for them at home for at least 24 hours after surgery. Patient encouraged to use gown warming system and informed that using said warming gown to regulate body temperature prior to a procedure has been shown to help reduce the risks of blood clots and infection. Patient's pharmacy of choice verified and documented in PTA medication section. Dual skin assessment & fall risk band verification completed with LAURIE Blair.   took tylenol this am- held tylenol order today. Has a bladder stimulator- communicated with holding nurse, Shilo Judge. Son to bring it in. .   Nozin pop-swabs and CHG wipes done upon arrival by BRITNEY GALLEGOS .   1139 am- bladder stimulator given to holding nurse- Tevin Luu RN.

## 2023-03-09 NOTE — ANESTHESIA PRE PROCEDURE
Department of Anesthesiology  Preprocedure Note       Name:  Christophe Sanford   Age:  81 y.o.  :  1940                                          MRN:  [id number]         Date:  3/9/2023      Surgeon: Cristy Sauer):  Carmella Gutierrez MD    Procedure: Procedure(s):  CYSTOSCOPY RANDOM BLADDER BIOPSY AND SELECTIVE CYTOLOGY WITH C-ARM \"SPEC POP\"    Medications prior to admission:   Prior to Admission medications    Medication Sig Start Date End Date Taking?  Authorizing Provider   Multiple Vitamins-Minerals (PRESERVISION AREDS 2+MULTI VIT PO) Take by mouth in the morning and at bedtime   Yes Historical Provider, MD   lipase-protease-amylase (CREON) 95718-45993 units delayed release capsule Take 36,000 Units by mouth 4 times daily   Yes Historical Provider, MD   calcium carbonate (TUMS) 500 MG chewable tablet Take 1 tablet by mouth 3 times daily    Historical Provider, MD   ondansetron (ZOFRAN) 4 MG tablet Take 4 mg by mouth every 8 hours as needed for Nausea or Vomiting  Patient not taking: Reported on 3/9/2023    Historical Provider, MD   Multiple Vitamins-Minerals (PRESERVISION AREDS) CAPS Take 2 capsules by mouth daily    Historical Provider, MD   carboxymethylcellulose 1 % ophthalmic solution Place 1 drop into both eyes 4 times daily    Historical Provider, MD   Multiple Vitamins-Minerals (THERAPEUTIC MULTIVITAMIN-MINERALS) tablet Take 1 tablet by mouth daily    Historical Provider, MD   acetaminophen (TYLENOL) 500 MG tablet Take 1,000 mg by mouth daily    Ar Automatic Reconciliation   alendronate (FOSAMAX) 70 MG tablet Take by mouth  Patient not taking: No sig reported    Ar Automatic Reconciliation   colesevelam (WELCHOL) 625 MG tablet Take 1,250 mg by mouth 2 times daily 20   Ar Automatic Reconciliation   docusate (COLACE, DULCOLAX) 100 MG CAPS Take 100 mg by mouth as needed    Ar Automatic Reconciliation   ergocalciferol (ERGOCALCIFEROL) 1.25 MG (70160 UT) capsule Take 50,000 Units by mouth once a week Takes Mondays    Ar Automatic Reconciliation   Ferrous Sulfate 324 MG TBEC Take 325 mg by mouth every morning (before breakfast)    Ar Automatic Reconciliation   fluticasone (FLONASE) 50 MCG/ACT nasal spray 1 spray by Nasal route as needed    Ar Automatic Reconciliation   HYDROcodone-acetaminophen (NORCO) 5-325 MG per tablet Take by mouth every 6 hours as needed. Patient not taking: No sig reported    Ar Automatic Reconciliation   ibuprofen (ADVIL;MOTRIN) 200 MG tablet Take 600 mg by mouth every 8 hours as needed    Ar Automatic Reconciliation   ibuprofen (ADVIL;MOTRIN) 600 MG tablet Take 600 mg by mouth every 8 hours as needed    Ar Automatic Reconciliation   loperamide (IMODIUM) 2 MG capsule Take 2 mg by mouth as needed    Ar Automatic Reconciliation   methocarbamol (ROBAXIN) 500 MG tablet Take 500 mg by mouth 4 times daily as needed  Patient not taking: No sig reported    Ar Automatic Reconciliation   omeprazole (PRILOSEC OTC) 20 MG tablet Take 20 mg by mouth daily    Ar Automatic Reconciliation   pregabalin (LYRICA) 75 MG capsule Take 150 mg by mouth 2 times daily. 7/9/20   Ar Automatic Reconciliation   saccharomyces boulardii (FLORASTOR) 250 MG capsule Take 250 mg by mouth daily    Ar Automatic Reconciliation   simvastatin (ZOCOR) 10 MG tablet Take 10 mg by mouth nightly    Ar Automatic Reconciliation   venlafaxine (EFFEXOR XR) 75 MG extended release capsule Take 75 mg by mouth daily    Ar Automatic Reconciliation       Current medications:    Current Facility-Administered Medications   Medication Dose Route Frequency Provider Last Rate Last Admin    acetaminophen (TYLENOL) tablet 1,000 mg  1,000 mg Oral Once Segundo Dose, MD        lactated ringers IV soln infusion   IntraVENous Continuous Sae Vargas  mL/hr at 03/09/23 0952 New Bag at 03/09/23 0952    levoFLOXacin (LEVAQUIN) 500 MG/100ML infusion 500 mg  500 mg IntraVENous On Call to [address], MD           Allergies:     Allergies Allergen Reactions    Shellfish Allergy Hives    Penicillins Hives    Silver      Other reaction(s): Unknown (comments)       Problem List:  There is no problem list on file for this patient. Past Medical History:        Diagnosis Date    Arthritis     neck & lower back    Bladder spasms     Cancer (HCC)     bladder    Chronic pain     GERD (gastroesophageal reflux disease)     Hyperlipidemia     IBS (irritable bowel syndrome)     Iron deficiency anemia     Neuropathy     PONV (postoperative nausea and vomiting)     Psychiatric disorder     anxiety    Vitamin D deficiency     Wears dentures     uppers    Wears glasses        Past Surgical History:        Procedure Laterality Date    APPENDECTOMY      CATARACT REMOVAL Bilateral     CHOLECYSTECTOMY      FEMUR FRACTURE SURGERY Left 06/2021    HYSTERECTOMY (CERVIX STATUS UNKNOWN)      TONSILLECTOMY      UROLOGICAL SURGERY      Axion implant    UROLOGICAL SURGERY  8 nov 2021    bladder tumor       Social History:    Social History     Tobacco Use    Smoking status: Former     Types: Cigarettes    Smokeless tobacco: Never   Substance Use Topics    Alcohol use:  Yes     Alcohol/week: 1.0 standard drink     Types: 1 Glasses of wine per week     Comment: socially  when out with family                                Counseling given: Not Answered      Vital Signs (Current):   Vitals:    03/09/23 0857   BP: (!) 119/57   Pulse: 72   Resp: 14   Temp: 97.6 °F (36.4 °C)   TempSrc: Temporal   SpO2: 95%   Weight: 169 lb 8 oz (76.9 kg)   Height: 5' 3\" (1.6 m)                                              BP Readings from Last 3 Encounters:   03/09/23 (!) 119/57       NPO Status: Time of last liquid consumption:  (sips of water 0720 am)                                                 Date of last liquid consumption: 03/09/23                        Date of last solid food consumption: 03/09/23    BMI:   Wt Readings from Last 3 Encounters:   03/09/23 169 lb 8 oz (76.9 kg)   03/03/23 165 lb 12.8 oz (75.2 kg)     Body mass index is 30.03 kg/m².    CBC:   Lab Results   Component Value Date/Time    WBC 5.3 03/06/2023 03:03 PM    RBC 4.34 03/06/2023 03:03 PM    HGB 12.3 03/06/2023 03:03 PM    HCT 38.9 03/06/2023 03:03 PM    MCV 89.6 03/06/2023 03:03 PM    RDW 13.5 03/06/2023 03:03 PM     03/06/2023 03:03 PM       CMP:   Lab Results   Component Value Date/Time     03/06/2023 03:03 PM    K 4.3 03/06/2023 03:03 PM     03/06/2023 03:03 PM    CO2 28 03/06/2023 03:03 PM    BUN 14 03/06/2023 03:03 PM    CREATININE 0.64 03/06/2023 03:03 PM    GFRAA >60 04/13/2021 02:30 PM    LABGLOM >60 03/06/2023 03:03 PM    GLUCOSE 99 03/06/2023 03:03 PM    CALCIUM 9.1 03/06/2023 03:03 PM       POC Tests: No results for input(s): POCGLU, POCNA, POCK, POCCL, POCBUN, POCHEMO, POCHCT in the last 72 hours.    Coags: No results found for: PROTIME, INR, APTT    HCG (If Applicable): No results found for: PREGTESTUR, PREGSERUM, HCG, HCGQUANT     ABGs: No results found for: PHART, PO2ART, RTO9HDO, FYK7VGO, BEART, Z2KJBHQP     Type & Screen (If Applicable):  No results found for: LABABO, LABRH    Drug/Infectious Status (If Applicable):  No results found for: HIV, HEPCAB    COVID-19 Screening (If Applicable):   Lab Results   Component Value Date/Time    COVID19 Not Detected 04/23/2021 10:00 AM           Anesthesia Evaluation  Patient summary reviewed and Nursing notes reviewed no history of anesthetic complications:   Airway: Mallampati: II  TM distance: >3 FB   Neck ROM: full  Mouth opening: > = 3 FB   Dental:    (+) lower dentures and upper dentures      Pulmonary:Negative Pulmonary ROS breath sounds clear to auscultation      (-) sleep apnea and not a current smoker                           Cardiovascular:  Exercise tolerance: good (>4 METS),         ECG reviewed  Rhythm: regular  Rate: normal                    Neuro/Psych:   (+) psychiatric history: stable with treatment  GI/Hepatic/Renal:   (+) GERD: well controlled,      (-) hiatal hernia       Endo/Other:                     Abdominal:             Vascular: Other Findings:           Anesthesia Plan      general     ASA 2       Induction: intravenous. MIPS: Postoperative opioids intended. Anesthetic plan and risks discussed with patient.                         Crystal Carr MD   3/9/2023

## 2023-04-18 ENCOUNTER — ANESTHESIA EVENT (OUTPATIENT)
Facility: HOSPITAL | Age: 83
End: 2023-04-18
Payer: MEDICARE

## 2023-04-19 NOTE — H&P
[address]rology [address] [address]  Tel: [phone]  Fax: [phone]    Patient : Bryce Ruiz   YOB: 1940   Birth Sex: Female      Current Gender: Female      Date:  03/22/2023 1:30 PM    Visit Type:   Post Op Visit   Assessment/Plan  # Detail Type Description    1. Assessment Encounter for follow-up examination after completed treatment for malignant neoplasm (A88). 2. 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 Will schedule her for cystoscopy right ureteroscopy possible right ureteral biopsy and repeat cytology from the right side to make sure there was not cross contamination from the bladder to confirm these high-grade cytology. Risks including pain infection bleeding need for stent reviewed with patient and daughter they understand will proceed         3. Assessment Personal history of malignant neoplasm of bladder (Z85.51). Additional Visit Information      This 81year old female presents for Overactive Bladder, UTI, Hematuria and Bladder CA. History of Present Illness  1. Overactive Bladder   Onset was gradual. It occurs daily. The problem is improving. Pertinent negatives include chills, constipation and fever. Additional information: pt w Symptoms consistent with overactive bladder including pelvic pressure burning before and after voiding frequency urgency urge incontinence she is currently on Detrol LA 4 mg which he thinks is helping she also thinks that helps better than Myrbetriq however she was on Myrbetriq for short period of time. At this time she will continue with current medications. .           Comments: 02/01/2021  Pt changed To start back on Myrbetriq 25 mg and stopped Detrol.   She still having

## 2023-04-20 ENCOUNTER — HOSPITAL ENCOUNTER (OUTPATIENT)
Facility: HOSPITAL | Age: 83
Setting detail: OUTPATIENT SURGERY
Discharge: HOME | End: 2023-04-20
Attending: UROLOGY | Admitting: UROLOGY
Payer: MEDICARE

## 2023-04-20 ENCOUNTER — APPOINTMENT (OUTPATIENT)
Facility: HOSPITAL | Age: 83
End: 2023-04-20
Attending: UROLOGY
Payer: MEDICARE

## 2023-04-20 ENCOUNTER — ANESTHESIA (OUTPATIENT)
Facility: HOSPITAL | Age: 83
End: 2023-04-20
Payer: MEDICARE

## 2023-04-20 VITALS
DIASTOLIC BLOOD PRESSURE: 58 MMHG | HEIGHT: 63 IN | BODY MASS INDEX: 30.35 KG/M2 | WEIGHT: 171.3 LBS | OXYGEN SATURATION: 97 % | RESPIRATION RATE: 16 BRPM | TEMPERATURE: 97.5 F | HEART RATE: 76 BPM | SYSTOLIC BLOOD PRESSURE: 131 MMHG

## 2023-04-20 PROCEDURE — C1747 HC ENDOSCOPE, SINGLE, URINARY TRACT: HCPCS | Performed by: UROLOGY

## 2023-04-20 PROCEDURE — 3600000002 HC SURGERY LEVEL 2 BASE: Performed by: UROLOGY

## 2023-04-20 PROCEDURE — C1726 CATH, BAL DIL, NON-VASCULAR: HCPCS | Performed by: UROLOGY

## 2023-04-20 PROCEDURE — 3700000000 HC ANESTHESIA ATTENDED CARE: Performed by: UROLOGY

## 2023-04-20 PROCEDURE — 88305 TISSUE EXAM BY PATHOLOGIST: CPT

## 2023-04-20 PROCEDURE — C2617 STENT, NON-COR, TEM W/O DEL: HCPCS | Performed by: UROLOGY

## 2023-04-20 PROCEDURE — 3600000012 HC SURGERY LEVEL 2 ADDTL 15MIN: Performed by: UROLOGY

## 2023-04-20 PROCEDURE — 7100000011 HC PHASE II RECOVERY - ADDTL 15 MIN: Performed by: UROLOGY

## 2023-04-20 PROCEDURE — 2580000003 HC RX 258: Performed by: UROLOGY

## 2023-04-20 PROCEDURE — 2709999900 HC NON-CHARGEABLE SUPPLY: Performed by: UROLOGY

## 2023-04-20 PROCEDURE — 6360000002 HC RX W HCPCS: Performed by: NURSE ANESTHETIST, CERTIFIED REGISTERED

## 2023-04-20 PROCEDURE — 6360000004 HC RX CONTRAST MEDICATION: Performed by: UROLOGY

## 2023-04-20 PROCEDURE — C1769 GUIDE WIRE: HCPCS | Performed by: UROLOGY

## 2023-04-20 PROCEDURE — 74420 UROGRAPHY RTRGR +-KUB: CPT

## 2023-04-20 PROCEDURE — 7100000000 HC PACU RECOVERY - FIRST 15 MIN: Performed by: UROLOGY

## 2023-04-20 PROCEDURE — 3700000001 HC ADD 15 MINUTES (ANESTHESIA): Performed by: UROLOGY

## 2023-04-20 PROCEDURE — C1758 CATHETER, URETERAL: HCPCS | Performed by: UROLOGY

## 2023-04-20 PROCEDURE — 6360000002 HC RX W HCPCS: Performed by: UROLOGY

## 2023-04-20 PROCEDURE — 7100000010 HC PHASE II RECOVERY - FIRST 15 MIN: Performed by: UROLOGY

## 2023-04-20 PROCEDURE — 88112 CYTOPATH CELL ENHANCE TECH: CPT

## 2023-04-20 PROCEDURE — 2500000003 HC RX 250 WO HCPCS: Performed by: NURSE ANESTHETIST, CERTIFIED REGISTERED

## 2023-04-20 DEVICE — URETERAL STENT
Type: IMPLANTABLE DEVICE | Site: URETER | Status: FUNCTIONAL
Brand: POLARIS™ ULTRA

## 2023-04-20 RX ORDER — SODIUM CHLORIDE 0.9 % (FLUSH) 0.9 %
5-40 SYRINGE (ML) INJECTION PRN
Status: DISCONTINUED | OUTPATIENT
Start: 2023-04-20 | End: 2023-04-20 | Stop reason: HOSPADM

## 2023-04-20 RX ORDER — LIDOCAINE HYDROCHLORIDE 20 MG/ML
INJECTION, SOLUTION EPIDURAL; INFILTRATION; INTRACAUDAL; PERINEURAL PRN
Status: DISCONTINUED | OUTPATIENT
Start: 2023-04-20 | End: 2023-04-20 | Stop reason: SDUPTHER

## 2023-04-20 RX ORDER — SODIUM CHLORIDE, SODIUM LACTATE, POTASSIUM CHLORIDE, CALCIUM CHLORIDE 600; 310; 30; 20 MG/100ML; MG/100ML; MG/100ML; MG/100ML
INJECTION, SOLUTION INTRAVENOUS CONTINUOUS
Status: DISCONTINUED | OUTPATIENT
Start: 2023-04-20 | End: 2023-04-20 | Stop reason: HOSPADM

## 2023-04-20 RX ORDER — LEVOFLOXACIN 5 MG/ML
500 INJECTION, SOLUTION INTRAVENOUS
Status: COMPLETED | OUTPATIENT
Start: 2023-04-20 | End: 2023-04-20

## 2023-04-20 RX ORDER — PROPOFOL 10 MG/ML
INJECTION, EMULSION INTRAVENOUS PRN
Status: DISCONTINUED | OUTPATIENT
Start: 2023-04-20 | End: 2023-04-20 | Stop reason: SDUPTHER

## 2023-04-20 RX ORDER — HYDROMORPHONE HYDROCHLORIDE 1 MG/ML
0.5 INJECTION, SOLUTION INTRAMUSCULAR; INTRAVENOUS; SUBCUTANEOUS EVERY 5 MIN PRN
Status: DISCONTINUED | OUTPATIENT
Start: 2023-04-20 | End: 2023-04-20 | Stop reason: HOSPADM

## 2023-04-20 RX ORDER — SODIUM CHLORIDE 9 MG/ML
INJECTION, SOLUTION INTRAVENOUS PRN
Status: DISCONTINUED | OUTPATIENT
Start: 2023-04-20 | End: 2023-04-20 | Stop reason: HOSPADM

## 2023-04-20 RX ORDER — FENTANYL CITRATE 50 UG/ML
INJECTION, SOLUTION INTRAMUSCULAR; INTRAVENOUS PRN
Status: DISCONTINUED | OUTPATIENT
Start: 2023-04-20 | End: 2023-04-20 | Stop reason: SDUPTHER

## 2023-04-20 RX ORDER — IPRATROPIUM BROMIDE AND ALBUTEROL SULFATE 2.5; .5 MG/3ML; MG/3ML
1 SOLUTION RESPIRATORY (INHALATION)
Status: DISCONTINUED | OUTPATIENT
Start: 2023-04-20 | End: 2023-04-20 | Stop reason: HOSPADM

## 2023-04-20 RX ORDER — SODIUM CHLORIDE 0.9 % (FLUSH) 0.9 %
5-40 SYRINGE (ML) INJECTION EVERY 12 HOURS SCHEDULED
Status: DISCONTINUED | OUTPATIENT
Start: 2023-04-20 | End: 2023-04-20 | Stop reason: HOSPADM

## 2023-04-20 RX ORDER — ONDANSETRON 2 MG/ML
INJECTION INTRAMUSCULAR; INTRAVENOUS PRN
Status: DISCONTINUED | OUTPATIENT
Start: 2023-04-20 | End: 2023-04-20 | Stop reason: SDUPTHER

## 2023-04-20 RX ORDER — FENTANYL CITRATE 50 UG/ML
25 INJECTION, SOLUTION INTRAMUSCULAR; INTRAVENOUS EVERY 5 MIN PRN
Status: DISCONTINUED | OUTPATIENT
Start: 2023-04-20 | End: 2023-04-20 | Stop reason: HOSPADM

## 2023-04-20 RX ORDER — PROCHLORPERAZINE EDISYLATE 5 MG/ML
5 INJECTION INTRAMUSCULAR; INTRAVENOUS
Status: DISCONTINUED | OUTPATIENT
Start: 2023-04-20 | End: 2023-04-20 | Stop reason: HOSPADM

## 2023-04-20 RX ADMIN — SODIUM CHLORIDE, POTASSIUM CHLORIDE, SODIUM LACTATE AND CALCIUM CHLORIDE: 600; 310; 30; 20 INJECTION, SOLUTION INTRAVENOUS at 07:26

## 2023-04-20 RX ADMIN — PROPOFOL 90 MG: 10 INJECTION, EMULSION INTRAVENOUS at 08:24

## 2023-04-20 RX ADMIN — FENTANYL CITRATE 25 MCG: 50 INJECTION, SOLUTION INTRAMUSCULAR; INTRAVENOUS at 08:55

## 2023-04-20 RX ADMIN — PROPOFOL 60 MG: 10 INJECTION, EMULSION INTRAVENOUS at 08:26

## 2023-04-20 RX ADMIN — FENTANYL CITRATE 25 MCG: 50 INJECTION, SOLUTION INTRAMUSCULAR; INTRAVENOUS at 09:01

## 2023-04-20 RX ADMIN — SODIUM CHLORIDE, POTASSIUM CHLORIDE, SODIUM LACTATE AND CALCIUM CHLORIDE: 600; 310; 30; 20 INJECTION, SOLUTION INTRAVENOUS at 09:22

## 2023-04-20 RX ADMIN — FENTANYL CITRATE 25 MCG: 50 INJECTION, SOLUTION INTRAMUSCULAR; INTRAVENOUS at 08:39

## 2023-04-20 RX ADMIN — LEVOFLOXACIN 500 MG: 5 INJECTION, SOLUTION INTRAVENOUS at 08:18

## 2023-04-20 RX ADMIN — LIDOCAINE HYDROCHLORIDE 60 MG: 20 INJECTION, SOLUTION EPIDURAL; INFILTRATION; INTRACAUDAL; PERINEURAL at 08:24

## 2023-04-20 RX ADMIN — ONDANSETRON HYDROCHLORIDE 4 MG: 2 INJECTION INTRAMUSCULAR; INTRAVENOUS at 08:56

## 2023-04-20 ASSESSMENT — LIFESTYLE VARIABLES: SMOKING_STATUS: 0

## 2023-04-20 ASSESSMENT — PAIN - FUNCTIONAL ASSESSMENT: PAIN_FUNCTIONAL_ASSESSMENT: 0-10

## 2023-04-20 NOTE — ANESTHESIA POSTPROCEDURE EVALUATION
Department of Anesthesiology  Postprocedure Note    Patient: Agata Edmondson  MRN: [id number]  YOB: 1940  Date of evaluation: 4/20/2023      Procedure Summary     Date: 04/20/23 Room / Location: [address] MAIN OR    Anesthesia Start: 0818 Anesthesia Stop: 0568    Procedure: CYSTOSCOPY, RIGHT RETROGRADE PYELOGRAM RIGHT URETEROSCOPY WITH STENT PLACEMENT W/C-ARM (Right: Ureter) Diagnosis:       Malignant neoplasm of posterior wall of urinary bladder ([address]      (MALIGNANT NEOPLASM OF POSTERIOR WALL OF BLADDER)    Surgeons: Ijeoma Lagos MD Responsible Provider: Cem Callahan MD    Anesthesia Type: General ASA Status: 2          Anesthesia Type: General    Danette Phase I: Danette Score: 10    Danette Phase II: Danette Score: 10      Anesthesia Post Evaluation    Patient location during evaluation: PACU  Patient participation: complete - patient participated  Level of consciousness: awake and alert  Pain score: 0  Airway patency: patent  Nausea & Vomiting: no nausea and no vomiting  Complications: no  Cardiovascular status: blood pressure returned to baseline  Respiratory status: acceptable  Hydration status: euvolemic

## 2023-04-20 NOTE — PERIOP NOTE
Reviewed PTA medication list with patient/caregiver and patient/caregiver denies any additional medications. Patient admits to having a responsible adult care for them at home for at least 24 hours after surgery. Patient encouraged to use gown warming system and informed that using said warming gown to regulate body temperature prior to a procedure has been shown to help reduce the risks of blood clots and infection. Patient's pharmacy of choice verified and documented in PTA medication section. Dual skin assessment & fall risk band verification completed with Devin Drifts. Consent (Spinal Accessory)/Introductory Paragraph: The rationale for Mohs was explained to the patient and consent was obtained. The risks, benefits and alternatives to therapy were discussed in detail. Specifically, the risks of damage to the spinal accessory nerve, infection, scarring, bleeding, prolonged wound healing, incomplete removal, allergy to anesthesia, and recurrence were addressed. Prior to the procedure, the treatment site was clearly identified and confirmed by the patient. All components of Universal Protocol/PAUSE Rule completed.

## 2023-04-20 NOTE — BRIEF OP NOTE
Brief Postoperative Note      Patient: Angel Recinos  YOB: 1940  MRN: [id number]    Date of Procedure: 4/20/2023    Pre-Op diagnosis: History of bladder cancer abnormal urine cytology right collecting system    Post-Op Diagnosis: Same       Procedure(s):  CYSTOSCOPY, RIGHT RETROGRADE PYELOGRAM RIGHT URETEROSCOPY WITH STENT PLACEMENT W/C-ARM    Surgeon(s):  Sissy Faustin MD    Assistant:  * No surgical staff found *    Anesthesia: General    Estimated Blood Loss (mL): Minimal    Complications: None    Specimens:   ID Type Source Tests Collected by Time Destination   A : Right kidney washing  Urine Kidney CYTOLOGY, NON-GYN Sissy Faustin MD 4/20/2023 5956        Implants:  Implant Name Type Inv.  Item Serial No.  Lot No. LRB No. Used Action   STENT URET 5FR L22CM PERCFLX HYDR+ DBL PGTL THRD 2 - [id number]  STENT URET 5FR L22CM PERCFLX HYDR+ DBL PGTL THRD 2  [facility]- [id number] Right 1 Implanted         Drains: * No LDAs found *    Findings: No evidence of any abnormalities and collecting system      Electronically signed by Parveen Travis MD on 4/20/2023 at 9:35 AM

## 2023-04-20 NOTE — PERIOP NOTE
TRANSFER - IN REPORT:    Verbal report received from OR RN on Earna Bibles  being received from OR for routine progression of patient care      Report consisted of patient's Situation, Background, Assessment and   Recommendations(SBAR). Information from the following report(s) Adult Overview, Surgery Report, Intake/Output, and MAR was reviewed with the receiving nurse. Opportunity for questions and clarification was provided. Assessment completed upon patient's arrival to unit and care assumed.

## 2023-04-20 NOTE — DISCHARGE INSTRUCTIONS
patient and caregiver verbalized understanding. Discharge medications reviewed with the patient and caregiver and appropriate educational materials and side effects teaching were provided.   ___________________________________________________________________________________________________________________________________

## 2023-04-20 NOTE — PERIOP NOTE
TRANSFER - IN REPORT:    Verbal report received from Lexi Acosta [facility] Shlomo (name) on Giuseppe Vieyra  being received from PACU (unit) for routine progression of patient care      Report consisted of patients Situation, Background, Assessment and   Recommendations(SBAR). Information from the following report(s) Nurse Handoff Report, Surgery Report, Intake/Output, and MAR was reviewed with the receiving nurse. Opportunity for questions and clarification was provided.       Assessment completed upon patients arrival to unit and care assume

## 2023-04-20 NOTE — INTERVAL H&P NOTE
Update History & Physical    The patient's History and Physical of March 22, 2023 was reviewed with the patient and I examined the patient. There was no change. The surgical site was confirmed by the patient and me. Plan: The risks, benefits, expected outcome, and alternative to the recommended procedure have been discussed with the patient. Patient understands and wants to proceed with the procedure.      Electronically signed by Morena Merrill MD on 4/20/2023 at 8:17 AM

## 2023-04-20 NOTE — PERIOP NOTE
TRANSFER - OUT REPORT:    Verbal report given to [address] on [employer]  being transferred to [facility] for routine progression of patient care       Report consisted of patient's Situation, Background, Assessment and   Recommendations(SBAR). Information from the following report(s) Adult Overview, Surgery Report, Intake/Output, and MAR was reviewed with the receiving nurse. Dalton Assessment: No data recorded  Lines:   Peripheral IV 04/20/23 Right Hand (Active)   Site Assessment Clean, dry & intact 04/20/23 0928   Line Status Infusing 04/20/23 0928   Line Care Connections checked and tightened 04/20/23 0725   Phlebitis Assessment No symptoms 04/20/23 0928   Infiltration Assessment 0 04/20/23 0928   Alcohol Cap Used No 04/20/23 0725   Dressing Status Clean, dry & intact 04/20/23 0928   Dressing Type Transparent 04/20/23 0928   Dressing Intervention New 04/20/23 0725        Opportunity for questions and clarification was provided.       Patient transported with:  Arizona Kitchens

## 2023-04-20 NOTE — OP NOTE
pyelogram was performed. There was no hydronephrosis or hydroureter or filling defects identified. I then used a 0.035 sensor   wire, intubated the open-ended catheter, placed this into the collecting system. . I then used a 10-cm ureteral balloon dilator and dilated the lower   ureter. Then using a dual-lumen catheter, a second wire was placed into the   collecting system. I advanced the flexible ureteroscope over the wire into the collecting system  I performed pyeloscopy and no tumors or abnormal lesions were seen in the collecting system. I removed the ureteroscope under direct vision and no lesions or tumors were seen in ureter. I also obtained urine from collecting system as well to send for cytology,  Then using cystoscopic guidance, a 5 x 22 double-J stent was placed   over the wire into the collecting system. I removed the   wire. There was a good coil in the kidney and a good coil in the bladder. The bladder was emptied. The string was brought out through the urethra and secured to the groin. The patient tolerated the procedure well. The patient was   transferred to recovery room in stable condition.            Henri Matthews MD  4/20/2023  4:06 PM     Electronically signed by Henri Matthews MD on 4/20/2023 at 9:32 AM
.

## 2023-04-20 NOTE — ANESTHESIA PRE PROCEDURE
capsule by mouth once a week Takes Mondays    Ar Automatic Reconciliation   Ferrous Sulfate 324 MG TBEC Take 325 mg by mouth every morning (before breakfast)    Ar Automatic Reconciliation   fluticasone (FLONASE) 50 MCG/ACT nasal spray 1 spray by Nasal route as needed    Ar Automatic Reconciliation   ibuprofen (ADVIL;MOTRIN) 200 MG tablet Take 3 tablets by mouth every 8 hours as needed    Ar Automatic Reconciliation   loperamide (IMODIUM) 2 MG capsule Take 2 capsules by mouth as needed    Ar Automatic Reconciliation   omeprazole (PRILOSEC OTC) 20 MG tablet Take 1 tablet by mouth daily    Ar Automatic Reconciliation   pregabalin (LYRICA) 75 MG capsule Take 150 mg by mouth 2 times daily. 7/9/20   Ar Automatic Reconciliation   saccharomyces boulardii (FLORASTOR) 250 MG capsule Take 250 mg by mouth daily    Ar Automatic Reconciliation   simvastatin (ZOCOR) 10 MG tablet Take 10 mg by mouth nightly    Ar Automatic Reconciliation   venlafaxine (EFFEXOR XR) 75 MG extended release capsule Take 1 capsule by mouth daily    Ar Automatic Reconciliation       Current medications:    Current Facility-Administered Medications   Medication Dose Route Frequency Provider Last Rate Last Admin    levoFLOXacin (LEVAQUIN) 500 MG/100ML infusion 500 mg  500 mg IntraVENous On Call to [address], [place]        lactated ringers IV soln infusion   IntraVENous Continuous Serge Phlegm,  mL/hr at 04/20/23 0746 NoRateChange at 04/20/23 0746       Allergies: Allergies   Allergen Reactions    Shellfish Allergy Hives    Penicillins Hives    Silver      Other reaction(s): Unknown (comments)       Problem List:  There is no problem list on file for this patient.       Past Medical History:        Diagnosis Date    Arthritis     neck & lower back    Bladder spasms     Cancer (HCC)     bladder    Chronic pain     Exercise tolerance finding     can walk from parking lot into building denies sob/cp    GERD (gastroesophageal reflux

## (undated) DEVICE — SUTURE COAT VCRL SZ 4-0 L18IN ABSRB UD L19MM PS-2 1/2 CIR J496G

## (undated) DEVICE — SOLUTION IRRIG 1500ML STRL H2O AQUALITE PLAS POUR BTL

## (undated) DEVICE — GLOVE ORANGE PI 7 1/2   MSG9075

## (undated) DEVICE — CYSTO PACK: Brand: MEDLINE INDUSTRIES, INC.

## (undated) DEVICE — TUBING, SUCTION, 1/4" X 12', STRAIGHT: Brand: MEDLINE

## (undated) DEVICE — STERILE POLYISOPRENE POWDER-FREE SURGICAL GLOVES: Brand: PROTEXIS

## (undated) DEVICE — SOLUTION IRRIG 3000ML H2O STRL BAG

## (undated) DEVICE — INTENDED FOR TISSUE SEPARATION, AND OTHER PROCEDURES THAT REQUIRE A SHARP SURGICAL BLADE TO PUNCTURE OR CUT.: Brand: BARD-PARKER ® CARBON RIB-BACK BLADES

## (undated) DEVICE — CATHETER URET L70CM OD6FR OPN END FLEXIMA

## (undated) DEVICE — DUAL LUMEN URETERAL CATHETER

## (undated) DEVICE — SPONGE GZ W4XL4IN COT 12 PLY TYP VII WVN C FLD DSGN

## (undated) DEVICE — STRAP,POSITIONING,KNEE/BODY,FOAM,4X60": Brand: MEDLINE

## (undated) DEVICE — SEAL INSTR CYSTO ADJ BX PRT SEAL FOR ACC UP TO 6FR

## (undated) DEVICE — PAD,NON-ADHERENT,3X8,STERILE,LF,1/PK: Brand: MEDLINE

## (undated) DEVICE — SUT CHRMC 4-0 27IN RB1 BRN --

## (undated) DEVICE — GARMENT,MEDLINE,DVT,INT,CALF,MED, GEN2: Brand: MEDLINE

## (undated) DEVICE — DEVICE SECUREMENT 1/32IN POLYETH FOAM F ANCHR URIN CATH

## (undated) DEVICE — STRIP,CLOSURE,WOUND,MEDI-STRIP,1/2X4: Brand: MEDLINE

## (undated) DEVICE — MARKER,SKIN,WI/RULER AND LABELS: Brand: MEDLINE

## (undated) DEVICE — REM POLYHESIVE ADULT PATIENT RETURN ELECTRODE: Brand: VALLEYLAB

## (undated) DEVICE — CATH URETH FOL 2W MED 22FRX5 --

## (undated) DEVICE — 3-0 COATED VICRYL PLUS UNDYED 1X27" SH --

## (undated) DEVICE — 3M™ TEGADERM™ TRANSPARENT FILM DRESSING FRAME STYLE, 1626W, 4 IN X 4-3/4 IN (10 CM X 12 CM), 50/CT 4CT/CASE: Brand: 3M™ TEGADERM™

## (undated) DEVICE — MASTISOL ADHESIVE LIQ 2/3ML

## (undated) DEVICE — CATH URETH FOL 2W MED 20FRX5 --

## (undated) DEVICE — LEAD IMPLANT KIT: Brand: AXONICS

## (undated) DEVICE — SYR 10ML CTRL LR LCK NSAF LF --

## (undated) DEVICE — CUTTING LOOP, BIPOLAR, 24/26 FR.: Brand: N.A.

## (undated) DEVICE — BAG PRESSURE INFUSION 3 W STOPCOCK 3000 CC PREMIERPRO DISP

## (undated) DEVICE — SINGLE-USE DIGITAL FLEXIBLE URETEROSCOPE: Brand: LITHOVUE

## (undated) DEVICE — NEUROSTIMULATOR: Brand: AXONICS

## (undated) DEVICE — DRAINBAG,ANTI-REFLUX TOWER,L/F,2000ML,LL: Brand: MEDLINE

## (undated) DEVICE — GLOVE ORANGE PI 7   MSG9070

## (undated) DEVICE — 1010 S-DRAPE TOWEL DRAPE 10/BX: Brand: STERI-DRAPE™

## (undated) DEVICE — MINOR: Brand: MEDLINE INDUSTRIES, INC.

## (undated) DEVICE — SEAL ENDOSCP INSTR 7FR BX SELF SEAL

## (undated) DEVICE — GUIDEWIRE UROLOGICAL STR 3 CM 0.038 INX150 CM DUAL-FLEX

## (undated) DEVICE — BALLOON DILATATION CATHETER: Brand: UROMAX ULTRA

## (undated) DEVICE — CYSTO PACK: Brand: CARDINAL HEALTH

## (undated) DEVICE — DRAPE XR C ARM 41X74IN LF --

## (undated) DEVICE — ADHESIVE LIQ H2O INSOLUBLE 3 CC LO RISK N STN MASTISOL

## (undated) DEVICE — TINED LEAD TEST STIM. CABLE: Brand: AXONICS

## (undated) DEVICE — SOLUTION IRRIG 3000ML 0.9% SOD CHL FLX CONT 0797208] ICU MEDICAL INC]

## (undated) DEVICE — SOLUTION IRRIG 1500ML 0.9% SOD CHL USP POUR PLAS BTL

## (undated) DEVICE — 3M™ STERI-DRAPE™ INCISE DRAPE 1050 (60CM X 45CM): Brand: STERI-DRAPE™

## (undated) DEVICE — STERILE LATEX POWDER-FREE SURGICAL GLOVESWITH NITRILE COATING: Brand: PROTEXIS

## (undated) DEVICE — C-ARMOR C-ARM EQUIPMENT COVERS CLEAR STERILE UNIVERSAL FIT 12 PER CASE: Brand: C-ARMOR

## (undated) DEVICE — STRIP WND CLSR FLX SELF ADH NO [TP1103] [INTEGRA LIFESCIENCES CORP]

## (undated) DEVICE — CATHETER F BLLN 5CC 22FR 2 W HYDRGEL COAT LESS TRAUM LUB

## (undated) DEVICE — INFLATION DEVICE: Brand: ENCORE 26

## (undated) DEVICE — ELECTRODE PT RET AD L9FT HI MOIST COND ADH HYDRGEL CORDED